# Patient Record
Sex: FEMALE | Race: WHITE | NOT HISPANIC OR LATINO | Employment: FULL TIME | URBAN - METROPOLITAN AREA
[De-identification: names, ages, dates, MRNs, and addresses within clinical notes are randomized per-mention and may not be internally consistent; named-entity substitution may affect disease eponyms.]

---

## 2017-04-25 ENCOUNTER — TELEPHONE (OUTPATIENT)
Dept: FAMILY MEDICINE | Facility: CLINIC | Age: 62
End: 2017-04-25

## 2017-04-25 ENCOUNTER — OFFICE VISIT (OUTPATIENT)
Dept: FAMILY MEDICINE | Facility: CLINIC | Age: 62
End: 2017-04-25
Payer: COMMERCIAL

## 2017-04-25 VITALS
HEART RATE: 71 BPM | TEMPERATURE: 98.2 F | WEIGHT: 112 LBS | RESPIRATION RATE: 14 BRPM | OXYGEN SATURATION: 99 % | HEIGHT: 63 IN | BODY MASS INDEX: 19.84 KG/M2 | SYSTOLIC BLOOD PRESSURE: 98 MMHG | DIASTOLIC BLOOD PRESSURE: 59 MMHG

## 2017-04-25 DIAGNOSIS — I73.00 RAYNAUD'S DISEASE WITHOUT GANGRENE: Primary | ICD-10-CM

## 2017-04-25 PROCEDURE — 99213 OFFICE O/P EST LOW 20 MIN: CPT | Performed by: FAMILY MEDICINE

## 2017-04-25 NOTE — NURSING NOTE
"Chief Complaint   Patient presents with     Hospital F/U       Initial BP 98/59  Pulse 71  Temp 98.2  F (36.8  C) (Oral)  Resp 14  Ht 5' 2.5\" (1.588 m)  Wt 112 lb (50.8 kg)  LMP 04/20/2015  SpO2 99%  BMI 20.16 kg/m2 Estimated body mass index is 20.16 kg/(m^2) as calculated from the following:    Height as of this encounter: 5' 2.5\" (1.588 m).    Weight as of this encounter: 112 lb (50.8 kg).  Medication Reconciliation: complete       Amber Love MA     "

## 2017-04-25 NOTE — TELEPHONE ENCOUNTER
Dr. Moss asked writer to contact patient due to MERS risk factor alert in chart.    Left message to call back and ask to speak with writer in triage.  ESTHER ZamoranoN, RN

## 2017-04-25 NOTE — MR AVS SNAPSHOT
"              After Visit Summary   4/25/2017    Arlene Beck    MRN: [id number]           Patient Information     Date Of Birth          1955        Visit Information        Provider Department      4/25/2017 10:00 AM Kait Moss MD [facility]        Today's Diagnoses     Raynaud's disease without gangrene    -  1       Follow-ups after your visit        Who to contact     If you have questions or need follow up information about today's clinic visit or your schedule please contact [facility] directly at [phone].  Normal or non-critical lab and imaging results will be communicated to you by [employer], letter or phone within 4 business days after the clinic has received the results. If you do not hear from us within 7 days, please contact the clinic through [employer] or phone. If you have a critical or abnormal lab result, we will notify you by phone as soon as possible.  Submit refill requests through [employer] or call your pharmacy and they will forward the refill request to us. Please allow 3 business days for your refill to be completed.          Additional Information About Your Visit        MyChart Information     [employer] gives you secure access to your electronic health record. If you see a primary care provider, you can also send messages to your care team and make appointments. If you have questions, please call your primary care clinic.  If you do not have a primary care provider, please call [phone] and they will assist you.        Care EveryWhere ID     This is your Care EveryWhere ID. This could be used by other organizations to access your [facility] records  [id number]        Your Vitals Were     Pulse Temperature Respirations Height Last Period Pulse Oximetry    71 98.2  F (36.8  C) (Oral) 14 5' 2.5\" (1.588 m) 04/20/2015 99%    BMI (Body Mass Index)                   20.16 kg/m2            Blood Pressure from Last 3 Encounters:   04/25/17 98/59 "   07/28/16 97/57   07/03/15 96/60    Weight from Last 3 Encounters:   04/25/17 112 lb (50.8 kg)   07/28/16 126 lb (57.2 kg)   07/03/15 122 lb (55.3 kg)              Today, you had the following     No orders found for display       Primary Care Provider Office Phone # Fax #    Kait Moss [phone]       [facility] [address] [facility] [address]        Thank you!     Thank you for choosing [facility]  for your care. Our goal is always to provide you with excellent care. Hearing back from our patients is one way we can continue to improve our services. Please take a few minutes to complete the written survey that you may receive in the mail after your visit with us. Thank you!             Your Updated Medication List - Protect others around you: Learn how to safely use, store and throw away your medicines at www.disposemymeds.org.          This list is accurate as of: 4/25/17 10:48 AM.  Always use your most recent med list.                   Brand Name Dispense Instructions for use    multivitamin per tablet     100    one daily, Dr Silva's  super food, takes 4 daily       * NONFORMULARY          * NONFORMULARY          Omega-3 & Omega-6 Fish Oil Caps          valACYclovir 500 MG tablet    VALTREX    60 tablet    Take 1 tablet (500 mg) by mouth 2 times daily       vitamin B complex with vitamin C Tabs tablet    STRESS TAB     takes two daily       * Notice:  This list has 2 medication(s) that are the same as other medications prescribed for you. Read the directions carefully, and ask your doctor or other care provider to review them with you.

## 2017-04-25 NOTE — PROGRESS NOTES
SUBJECTIVE:                                                    Arlene Beck is a 61 year old female who presents to clinic today for the following health issues:    ED/UC Followup:    [facility]ility[facility]   Date of visit: 4/19/17  Reason for visit: Raynaud syndrome   Current Status: Feeling fine today.        Patient has previously had Raynaud sx but never like this. Her fingers were white at the tips, her fourth finger was completely white to her ring, even after she removed her ring, and her fingers were numb. Her mother and younger sister have the same symptoms. Patient has had issues before with her hands, which have always tended to be cold but this episode surprised her. She was not in contact with anything cold, it was fairly warm out. The symptoms remained for longer than usual, so she called triage and was recommended to go to UC. They turned blue, then red, then it resolved by the time she was at .     Years ago she was diagnosed with chronic fatigue- Maxime Apiz but declines sx since. Has maintained a healthy diet and activity.    Patient has had some wrist pain with repetitive motion. She is conscious of getting up and moving around during the work day. For awhile she was having routine massages of her wrists for relief. She denies other joint pains. No complaint of muscle aches, malaise, fatigue, fevers. She has history of chronic fatigue 30 years ago, but no symptoms since that time.     She denies family history of autoimmune disease.       Problem list and histories reviewed & adjusted, as indicated.  Additional history: as documented    Patient Active Problem List   Diagnosis     Genital herpes     Irritable bowel syndrome     CARDIOVASCULAR SCREENING; LDL GOAL LESS THAN 160     Advanced directives, counseling/discussion     Fatigue     Well woman exam (no gynecological exam)     Raynaud's disease without gangrene     Past Surgical History:   Procedure Laterality Date     C  APPENDECTOMY  1967     C EXCISION OF LINGUAL TONSIL  1963     LAPAROSCOPY PROCEDURE UNLISTED  1975    2 times, for pain      TUBAL LIGATION  2002       Social History   Substance Use Topics     Smoking status: Never Smoker     Smokeless tobacco: Never Used     Alcohol use Yes      Comment: 3-4 wk wine     Family History   Problem Relation Age of Onset     Eye Disorder Mother      glaucoma     Allergies Mother      Breast Cancer Mother      double mastectomy     C.A.D. Father      CANCER Father      prostate     Allergies Father      HEART DISEASE Father      DIABETES Father      Hypertension Father      CEREBROVASCULAR DISEASE Father      Prostate Cancer Father      Asthma Maternal Grandmother      OSTEOPOROSIS Maternal Grandmother      Thyroid Disease Maternal Grandmother      Hypertension Sister      GASTROINTESTINAL DISEASE Sister      hyperplasia     Lipids Brother      tachycardia, knee and joint problems         Current Outpatient Prescriptions   Medication Sig Dispense Refill     valACYclovir (VALTREX) 500 MG tablet Take 1 tablet (500 mg) by mouth 2 times daily 60 tablet 9     NONFORMULARY        NONFORMULARY        OMEGA-3 & OMEGA-6 FISH OIL OR CAPS   0     MULTIVITAMINS OR TABS one daily, Dr Silva's  super food, takes 4 daily 100 1 YEAR     VITAMIN B COMPLEX OR TABS takes two daily  0     Allergies   Allergen Reactions     Codeine      Sulfa Drugs      Recent Labs   Lab Test  07/28/16   0855  08/24/15   0917  07/03/15   0755  08/01/14   1031   08/01/14   0810   LDL  120*   --   131*   --    --   106   HDL  94   --   64   --    --   83   TRIG  73   --   105   --    --   142   ALT   --   19   --   22   --    --    CR  0.89  0.80   --   0.87   < >   --    GFRESTIMATED  65  73   --   67   < >   --    GFRESTBLACK  79  88   --   81   < >   --    POTASSIUM  4.0  4.0   --   3.7   < >   --    TSH   --    --   1.29  0.84   --    --     < > = values in this interval not displayed.      BP Readings from Last 3  "Encounters:   04/25/17 98/59   07/28/16 97/57   07/03/15 96/60    Wt Readings from Last 3 Encounters:   04/25/17 50.8 kg (112 lb)   07/28/16 57.2 kg (126 lb)   07/03/15 55.3 kg (122 lb)        Reviewed and updated as needed this visit by clinical staff  Reviewed and updated as needed this visit by Provider    ROS:  Denies chest pain, SOB, URI sx. See above.    This document serves as a record of the services and decisions personally performed and made by Kait Moss MD. It was created on his/her behalf by Blessing Guerrero, trained medical scribe. The creation of this document is based the provider's statements to the medical scribes.    Scribe Blessing Guerrero, April 25, 2017  OBJECTIVE:                                                    BP 98/59  Pulse 71  Temp 98.2  F (36.8  C) (Oral)  Resp 14  Ht 1.588 m (5' 2.5\")  Wt 50.8 kg (112 lb)  LMP 04/20/2015  SpO2 99%  BMI 20.16 kg/m2  Body mass index is 20.16 kg/(m^2).  GENERAL: healthy, alert and no distress    Diagnostic Test Results:  none      ASSESSMENT/PLAN:                                                    1. Raynaud's disease without gangrene  Symptoms completely resolved after being seen in the . If recurrent symptoms to the same degree, could consider further testing, referral to ortho hand if significant carpal tunnel symptoms or to rheumatology. She will continue to try to avoid repetitive stress to wrists and continue massage, which has been helpful. Discussed sometimes medications such as calcium channel blockers like nifedipine or amlodipine are used to treat raynaud's, but her episodes are intermittent and mild and manageable in general by keeping her hands warm and she is not interested in medication.     She will schedule her preventive exam this summer.     The information in this document, created by the medical scribe for me, accurately reflects the services I personally performed and the decisions made by me. I have reviewed and " approved this document for accuracy. 04/25/17    Kait Moss MD  [facility]

## 2017-08-04 ENCOUNTER — MYC MEDICAL ADVICE (OUTPATIENT)
Dept: FAMILY MEDICINE | Facility: CLINIC | Age: 62
End: 2017-08-04

## 2017-08-04 DIAGNOSIS — Z12.31 ENCOUNTER FOR SCREENING MAMMOGRAM FOR BREAST CANCER: Primary | ICD-10-CM

## 2017-08-07 ENCOUNTER — OFFICE VISIT (OUTPATIENT)
Dept: FAMILY MEDICINE | Facility: CLINIC | Age: 62
End: 2017-08-07
Payer: COMMERCIAL

## 2017-08-07 VITALS
BODY MASS INDEX: 18.96 KG/M2 | WEIGHT: 107 LBS | RESPIRATION RATE: 14 BRPM | HEART RATE: 70 BPM | SYSTOLIC BLOOD PRESSURE: 101 MMHG | OXYGEN SATURATION: 98 % | HEIGHT: 63 IN | TEMPERATURE: 98.3 F | DIASTOLIC BLOOD PRESSURE: 66 MMHG

## 2017-08-07 DIAGNOSIS — Z13.1 SCREENING FOR DIABETES MELLITUS: ICD-10-CM

## 2017-08-07 DIAGNOSIS — N64.59 INVERSION, NIPPLE: ICD-10-CM

## 2017-08-07 DIAGNOSIS — Z78.9 ENGAGES IN TRAVEL ABROAD: ICD-10-CM

## 2017-08-07 DIAGNOSIS — Z00.00 ROUTINE GENERAL MEDICAL EXAMINATION AT A HEALTH CARE FACILITY: Primary | ICD-10-CM

## 2017-08-07 DIAGNOSIS — Z13.220 LIPID SCREENING: ICD-10-CM

## 2017-08-07 DIAGNOSIS — Z12.31 ENCOUNTER FOR SCREENING MAMMOGRAM FOR BREAST CANCER: ICD-10-CM

## 2017-08-07 DIAGNOSIS — A60.00 GENITAL HERPES SIMPLEX, UNSPECIFIED SITE: ICD-10-CM

## 2017-08-07 DIAGNOSIS — Z12.11 COLON CANCER SCREENING: ICD-10-CM

## 2017-08-07 LAB
ANION GAP SERPL CALCULATED.3IONS-SCNC: 6 MMOL/L (ref 3–14)
BUN SERPL-MCNC: 15 MG/DL (ref 7–30)
CALCIUM SERPL-MCNC: 9 MG/DL (ref 8.5–10.1)
CHLORIDE SERPL-SCNC: 110 MMOL/L (ref 94–109)
CHOLEST SERPL-MCNC: 239 MG/DL
CO2 SERPL-SCNC: 27 MMOL/L (ref 20–32)
CREAT SERPL-MCNC: 0.89 MG/DL (ref 0.52–1.04)
GFR SERPL CREATININE-BSD FRML MDRD: 65 ML/MIN/1.7M2
GLUCOSE SERPL-MCNC: 88 MG/DL (ref 70–99)
HDLC SERPL-MCNC: 76 MG/DL
LDLC SERPL CALC-MCNC: 150 MG/DL
NONHDLC SERPL-MCNC: 163 MG/DL
POTASSIUM SERPL-SCNC: 4.1 MMOL/L (ref 3.4–5.3)
SODIUM SERPL-SCNC: 143 MMOL/L (ref 133–144)
TRIGL SERPL-MCNC: 65 MG/DL

## 2017-08-07 PROCEDURE — 80061 LIPID PANEL: CPT | Performed by: FAMILY MEDICINE

## 2017-08-07 PROCEDURE — 99396 PREV VISIT EST AGE 40-64: CPT | Performed by: FAMILY MEDICINE

## 2017-08-07 PROCEDURE — 99213 OFFICE O/P EST LOW 20 MIN: CPT | Mod: 25 | Performed by: FAMILY MEDICINE

## 2017-08-07 PROCEDURE — 36415 COLL VENOUS BLD VENIPUNCTURE: CPT | Performed by: FAMILY MEDICINE

## 2017-08-07 PROCEDURE — 80048 BASIC METABOLIC PNL TOTAL CA: CPT | Performed by: FAMILY MEDICINE

## 2017-08-07 RX ORDER — VALACYCLOVIR HYDROCHLORIDE 500 MG/1
500 TABLET, FILM COATED ORAL 2 TIMES DAILY
Qty: 60 TABLET | Refills: 9 | Status: SHIPPED | OUTPATIENT
Start: 2017-08-07 | End: 2018-08-09
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
## 2017-08-07 NOTE — MR AVS SNAPSHOT
After Visit Summary   8/7/2017    Arlene Beck    MRN: [id number]           Patient Information     Date Of Birth          1955        Visit Information        Provider Department      8/7/2017 9:00 AM Kait Moss MD [facility]        Today's Diagnoses     Routine general medical examination at a health care facility    -  1    Inversion, nipple        Encounter for screening mammogram for breast cancer        Lipid screening        Screening for diabetes mellitus        Engages in travel abroad        Colon cancer screening        Genital herpes simplex, unspecified site        Genital herpes simplex          Care Instructions    I recommend that you visit the travel clinic in [facility] prior to international travel. They will review the immunizations, travel counseling and medications you might need prior to travel. You may also look at the [facility] website for the specific country to which you are traveling to get an idea of immunization needs and any potential risks/concerns regarding travel to the area.     Schedule a mammogram. The right side will be a diagnostic mammogram, since you have been observing a change in the right breast (nipple inversion). The left side will be a normal screening mammogram. Call your insurance prior to scheduling so you have an idea of the coverage for these services and whether mammogram with tomography is covered.       Preventive Health Recommendations  Female Ages 50 - 64    Yearly exam: See your health care provider every year in order to  o Review health changes.   o Discuss preventive care.    o Review your medicines if your doctor has prescribed any.      Get a Pap test every three years (unless you have an abnormal result and your provider advises testing more often).    If you get Pap tests with HPV test, you only need to test every 5 years, unless you have an abnormal result.     You do not need a Pap test if your uterus was removed  (hysterectomy) and you have not had cancer.    You should be tested each year for STDs (sexually transmitted diseases) if you're at risk.     Have a mammogram every 1 to 2 years.    Have a colonoscopy at age 50, or have a yearly FIT test (stool test). These exams screen for colon cancer.      Have a cholesterol test every 5 years, or more often if advised.    Have a diabetes test (fasting glucose) every three years. If you are at risk for diabetes, you should have this test more often.     If you are at risk for osteoporosis (brittle bone disease), think about having a bone density scan (DEXA).    Shots: Get a flu shot each year. Get a tetanus shot every 10 years.    Nutrition:     Eat at least 5 servings of fruits and vegetables each day.    Eat whole-grain bread, whole-wheat pasta and brown rice instead of white grains and rice.    Talk to your provider about Calcium and Vitamin D.     Lifestyle    Exercise at least 150 minutes a week (30 minutes a day, 5 days a week). This will help you control your weight and prevent disease.    Limit alcohol to one drink per day.    No smoking.     Wear sunscreen to prevent skin cancer.     See your dentist every six months for an exam and cleaning.    See your eye doctor every 1 to 2 years.            Follow-ups after your visit        Additional Services     TRAVEL CLINIC REFERRAL       Your provider has referred you to the [facility] - Please call [phone] to schedule an appointment.   Fax number: [phone]    Please be aware that coverage of these services is subject to the terms and limitations of your health insurance plans.  Call member services at your health plan with any benefit coverage questions.                  Future tests that were ordered for you today     Open Future Orders        Priority Expected Expires Ordered    MA Screen Left w/Mir Routine  8/7/2018 8/7/2017    MA Diagnostic Right w/Mir Routine  8/7/2018 8/7/2017    Fecal  "colorectal cancer screen FIT Routine 8/28/2017 10/30/2017 8/7/2017            Who to contact     If you have questions or need follow up information about today's clinic visit or your schedule please contact [facility] directly at [phone].  Normal or non-critical lab and imaging results will be communicated to you by MyChart, letter or phone within 4 business days after the clinic has received the results. If you do not hear from us within 7 days, please contact the clinic through [employer] or phone. If you have a critical or abnormal lab result, we will notify you by phone as soon as possible.  Submit refill requests through [employer] or call your pharmacy and they will forward the refill request to us. Please allow 3 business days for your refill to be completed.          Additional Information About Your Visit        [employer] Information     [employer] gives you secure access to your electronic health record. If you see a primary care provider, you can also send messages to your care team and make appointments. If you have questions, please call your primary care clinic.  If you do not have a primary care provider, please call [phone] and they will assist you.        Care EveryWhere ID     This is your Care EveryWhere ID. This could be used by other organizations to access your [place] medical records  [id number]        Your Vitals Were     Pulse Temperature Respirations Height Last Period Pulse Oximetry    70 98.3  F (36.8  C) (Oral) 14 5' 2.5\" (1.588 m) 04/20/2015 98%    BMI (Body Mass Index)                   19.26 kg/m2            Blood Pressure from Last 3 Encounters:   08/07/17 101/66   04/25/17 98/59   07/28/16 97/57    Weight from Last 3 Encounters:   08/07/17 107 lb (48.5 kg)   04/25/17 112 lb (50.8 kg)   07/28/16 126 lb (57.2 kg)              We Performed the Following     Basic metabolic panel     Lipid panel reflex to direct LDL     OFFICE/OUTPT VISIT,EST,LEVL III     TRAVEL CLINIC " REFERRAL          Where to get your medicines      These medications were sent to [facility] - [place], [place] - [address]  [address], [facility] [address]     Phone:  [phone]     valACYclovir 500 MG tablet          Primary Care Provider Office Phone # Fax #    Kait Moss [phone]       [facility] [address]  [facility] [address]        Equal Access to Services     CRUZ OSULLIVAN : Hadii aad ku hadasho Soomaali, waaxda luqadaha, qaybta kaalmada adeegyada, waxay idiin hayaan adeeg kharash la'sharad . So [facility] [phone].    ATENCIÓN: Si habla español, tiene a garcia disposición servicios gratuitos de asistencia lingüística. [facility] [phone].    We comply with applicable federal civil rights laws and [place] laws. We do not discriminate on the basis of race, color, national origin, age, disability sex, sexual orientation or gender identity.            Thank you!     Thank you for choosing [facility]  for your care. Our goal is always to provide you with excellent care. Hearing back from our patients is one way we can continue to improve our services. Please take a few minutes to complete the written survey that you may receive in the mail after your visit with us. Thank you!             Your Updated Medication List - Protect others around you: Learn how to safely use, store and throw away your medicines at www.disposemymeds.org.          This list is accurate as of: 8/7/17  9:18 AM.  Always use your most recent med list.                   Brand Name Dispense Instructions for use Diagnosis    multivitamin per tablet     100    one daily, Dr Silva's  super food, takes 4 daily    Routine general medical examination at a health care facility       * NONFORMULARY           * NONFORMULARY           Omega-3 & Omega-6 Fish Oil Caps       Routine general medical examination at a health care facility       valACYclovir 500 MG tablet     VALTREX    60 tablet    Take 1 tablet (500 mg) by mouth 2 times daily    Genital herpes simplex, unspecified site       vitamin B complex with vitamin C Tabs tablet    STRESS TAB     takes two daily    Routine general medical examination at a health care facility       * Notice:  This list has 2 medication(s) that are the same as other medications prescribed for you. Read the directions carefully, and ask your doctor or other care provider to review them with you.

## 2017-08-07 NOTE — PATIENT INSTRUCTIONS
I recommend that you visit the travel clinic in [facility] prior to international travel. They will review the immunizations, travel counseling and medications you might need prior to travel. You may also look at the [facility] website for the specific country to which you are traveling to get an idea of immunization needs and any potential risks/concerns regarding travel to the area.     Schedule a mammogram. The right side will be a diagnostic mammogram, since you have been observing a change in the right breast (nipple inversion). The left side will be a normal screening mammogram. Call your insurance prior to scheduling so you have an idea of the coverage for these services and whether mammogram with tomography is covered.       Preventive Health Recommendations  Female Ages 50 - 64    Yearly exam: See your health care provider every year in order to  o Review health changes.   o Discuss preventive care.    o Review your medicines if your doctor has prescribed any.      Get a Pap test every three years (unless you have an abnormal result and your provider advises testing more often).    If you get Pap tests with HPV test, you only need to test every 5 years, unless you have an abnormal result.     You do not need a Pap test if your uterus was removed (hysterectomy) and you have not had cancer.    You should be tested each year for STDs (sexually transmitted diseases) if you're at risk.     Have a mammogram every 1 to 2 years.    Have a colonoscopy at age 50, or have a yearly FIT test (stool test). These exams screen for colon cancer.      Have a cholesterol test every 5 years, or more often if advised.    Have a diabetes test (fasting glucose) every three years. If you are at risk for diabetes, you should have this test more often.     If you are at risk for osteoporosis (brittle bone disease), think about having a bone density scan (DEXA).    Shots: Get a flu shot each year. Get a tetanus shot every 10  years.    Nutrition:     Eat at least 5 servings of fruits and vegetables each day.    Eat whole-grain bread, whole-wheat pasta and brown rice instead of white grains and rice.    Talk to your provider about Calcium and Vitamin D.     Lifestyle    Exercise at least 150 minutes a week (30 minutes a day, 5 days a week). This will help you control your weight and prevent disease.    Limit alcohol to one drink per day.    No smoking.     Wear sunscreen to prevent skin cancer.     See your dentist every six months for an exam and cleaning.    See your eye doctor every 1 to 2 years.

## 2017-08-07 NOTE — NURSING NOTE
"Chief Complaint   Patient presents with     Physical       Initial /66  Pulse 70  Temp 98.3  F (36.8  C) (Oral)  Resp 14  Ht 5' 2.5\" (1.588 m)  Wt 107 lb (48.5 kg)  LMP 04/20/2015  SpO2 98%  BMI 19.26 kg/m2 Estimated body mass index is 19.26 kg/(m^2) as calculated from the following:    Height as of this encounter: 5' 2.5\" (1.588 m).    Weight as of this encounter: 107 lb (48.5 kg).  Medication Reconciliation: complete     Amber Love MA     "

## 2017-08-11 ENCOUNTER — HOSPITAL ENCOUNTER (OUTPATIENT)
Dept: MAMMOGRAPHY | Facility: CLINIC | Age: 62
Discharge: HOME | End: 2017-08-11
Attending: FAMILY MEDICINE | Admitting: FAMILY MEDICINE
Payer: COMMERCIAL

## 2017-08-11 DIAGNOSIS — Z12.31 VISIT FOR SCREENING MAMMOGRAM: ICD-10-CM

## 2017-08-11 PROCEDURE — G0202 SCR MAMMO BI INCL CAD: HCPCS

## 2017-08-11 PROCEDURE — 77063 BREAST TOMOSYNTHESIS BI: CPT

## 2017-08-29 PROCEDURE — 82274 ASSAY TEST FOR BLOOD FECAL: CPT | Performed by: FAMILY MEDICINE

## 2017-08-29 NOTE — PROGRESS NOTES
The results of your recent lipid (cholesterol) profile were mildly abnormal.    Here are the results:  Lab Results       Component                Value               Date                       CHOL                     239                 08/07/2017            Lab Results       Component                Value               Date                       HDL                      76                  08/07/2017            Lab Results       Component                Value               Date                       LDL                      150                 08/07/2017            Lab Results       Component                Value               Date                       TRIG                     65                  08/07/2017            Lab Results       Component                Value               Date                       CHOLHDLRATIO             3.4                 07/03/2015              Desired or goal levels are:  CHOLESTEROL: Desirable is less than 200.   HDL (Good Cholesterol): Desirable is greater than 40 (for men) greater than 50 (for women).  LDL (Bad Cholesterol): Desirable is less than 130 (or less than 100 if you have heart disease or diabetes). Borderline 130-160.  TRIGLYCERIDES: Desirable is less than 150.  Borderline is 150-200.    To help lower your LDL (bad cholesterol) you can start adding ground flax seed to your diet. The recommended dose is 2 heaping tablespoonfuls daily, you may want to start with 1 tablespoonful and increase to 2 heaping tablespoonfuls. You can mix or add ground flax seed to hot cereals, yogurt, applesauce, cottage cheese or any sauce mixture that is your portion. Ground flax seed can be found in the baking aisle near the flour.      As you may know, an elevated cholesterol is one factor that increases your risk for heart disease and stroke. You can improve your cholesterol by controlling the amount and type of fat you eat and by increasing your daily activity level.    Here are some ways  to improve your nutrition:  Eat less fat (especially butter, Crisco and other saturated fats)  Buy lean cuts of meat, reduce your portions of red meat or substitute poultry or fish  Use skim milk and low-fat dairy products  Eat no more than 4 egg yolks per week  Avoid fried or fast foods that are high in fat  Eat more fruits and vegetables      Also consider starting or increasing your aerobic activity. Aerobic activity is the best way to improve HDL (good) cholesterol. If this would be new to you, please talk with me first about what activities are safe for you.      Other lab results:    The testing of your blood sugar, kidney function, and electrolytes was stable.      Please feel free to contact us with any questions or if you would like more information.    Kait Moss M.D.

## 2017-08-31 DIAGNOSIS — Z12.11 COLON CANCER SCREENING: ICD-10-CM

## 2017-08-31 LAB — HEMOCCULT STL QL IA: NEGATIVE

## 2017-09-05 NOTE — PROGRESS NOTES
Excellent! Please call or sent a "[employer]" message if you have any questions. Kait Moss M.D.

## 2018-02-23 ENCOUNTER — VIRTUAL VISIT (OUTPATIENT)
Dept: FAMILY MEDICINE | Facility: OTHER | Age: 63
End: 2018-02-23

## 2018-02-23 ENCOUNTER — TELEPHONE (OUTPATIENT)
Dept: FAMILY MEDICINE | Facility: CLINIC | Age: 63
End: 2018-02-23

## 2018-02-23 NOTE — TELEPHONE ENCOUNTER
Influenza-Like Illness (MURTAZA) Protocol    Arlene Jaydaayden Beck      Age: 62 year old     YOB: 1955    Are you currently sick or have you had close contact with someone who is currently sick?   Yes, this patient is currently sick.     Adult Clinical Evaluation    Is this patient experiencing ANY of the following?  Unconsciousness or unresponsiveness No   Difficulty breathing or swallowing No   Blue or dusky lips, skin, or nail beds No   Chest pain No   Severe confusion or delirium No   Seizure activity: ongoing or stopped No   Severe dehydration or signs of shock No   Patient sounds very sick on the phone No     Is this patient experiencing ANY of the following?  Fever > 104 or shaking chills No   Wheezing with minimal response to usual wheezing medications or new wheezing No   Repeated vomiting or diarrhea with signs of dehydration (no urination within last 12 hours) No   Flu-like symptoms that initially improved but returned with fever and a worse cough No   Stiff or painful neck Yes.  Patient advised to be taken to the ED now.   Severe headache No     Does the patient have any of the following?  Measured fever > 100 degrees Yes   Chills or feels very warm to the touch Yes   Cough Yes   Sore throat Yes   Muscle/ body aches Yes   Headaches Yes   Fatigue (tiredness) Yes     Nursing Plan  Routed chart to provider, patient states she has a very stiff and painful neck  Was advised to go to ER per protocol  Patient states she will likely go to an Urgent Care near her house because she does not want to go to ER and does not want to drive to clinic  Wanted Tamiflu over the phone    Provided home care instructions    General home care instruction:      Avoid contact with people in your household who are at increased risk for more severe complications of influenza (such as pregnant women or people who have a chronic health condition, for example diabetes, heart disease, asthma, or emphysema).    Stay home from  work, school, childcare or other public places until your fever (37.8 degrees Celsius [100 degrees Fahrenheit]) has been gone for at least 24 hours, except to seek medical care. (Fever should be gone without the use of fever-reducing medications.) Use a surgical mask if available, or cover your mouth and nose with a tissue if possible if you need to seek medical care. Contact your work place, school, or  as they may have longer exclusion times.    You may continue to shed virus after your fever is gone. Limit your contact with high-risk individuals for 10 days after your symptoms started and be especially careful to cover your coughs/sneezes and wash your hands.    Cover your cough and wash your hands often, and especially after coughing, sneezing, blowing your nose.    Drink plenty of fluids (such as water, broth, sports drinks, electrolyte beverages for children) to prevent dehydration.    Avoid tobacco and second hand smoke.    Get plenty of rest.    Use over-the-counter pain relievers as needed per  instructions.    Do not give aspirin (acetylsalicylic acid) or products that contain aspirin (e.g. bismuth subsalicylate - Pepto Bismol) to children or teenagers 18 years or younger.    A small number of people with influenza do not have fever. If you have respiratory symptoms and are at increased risk for complications of influenza, contact your health care provider to discuss these symptoms.    For parents of infants:    If possible, only family members who are not sick should care for infants.    Wash your hands with soap and water, or an alcohol-based hand rub (if your hands are not visibly soiled) before caring for your infant.    Cover your mouth and nose with a tissue when coughing or sneezing, and clean your hands.    Contact a health care provider to discuss your illness within 1-2 days if you are    Pregnant    Immunocompromised    Call 911 if you experience:    Difficulty breathing or  shortness of breath    Pain or pressure in the chest    Confusion or less responsive than normal    Seizure activity: ongoing or stopped    Severe dehydration or signs of shock     Blue or dusky lips, skin, or nail beds    If further questions/concerns or if new symptoms develop, call your PCP or [facility] as soon as possible.    When to seek medical attention    Contact your health care provider right away if you experience:    A painful sore throat accompanied by fever persists for more than 48 hours    Ear pain, sinus pain, persistent vomiting and/or diarrhea    Oral temperature greater than 104  Fahrenheit (40  Celsius)    Dehydration (e.g., mouth feeling dry, dizzy when sitting/standing, decreased urine output)    Severe or persistent vomiting; unable to keep fluids down    Improvement in flu-like symptoms (fever and cough or sore throat) but then return of fever and worse cough or sore throat    Not drinking enough fluid    Any other concerns not stated above      Additional educational resources include:    http://www.[employer].com    http://www.cdc.gov/flu/  Ivonne Quinones

## 2018-02-23 NOTE — PROGRESS NOTES
"Date:   Clinician: Frandy Lombardi  Clinician NPI: [id number]  Patient: Arlene Beck  Patient : 1955  Patient Address: [address] [place] [address]  Patient Phone: [phone]  Visit Protocol: URI  Patient Summary:  Arlene is a 62 year old ( : 1955 ) female who initiated a Visit for cold, sinus infection, or influenza. When asked the question \"Please sign me up to receive news, health information and promotions from [employer].\", Arlene responded \"No\".    Arlene states her symptoms started 1-2 days ago.   Her symptoms consist of myalgia, rhinitis, malaise, a headache, chills, nasal congestion, a cough, and facial pain or pressure. Arlene also feels feverish.   Symptom details     Nasal secretions: The color of her mucus is yellow.    Cough: Arlene coughs a few times an hour and her cough is more bothersome at night. Phlegm comes into her throat when she coughs. She believes the phlegm causes the cough. The color of the phlegm is yellow.     Temperature: Her current temperature is 101.4 degrees Fahrenheit. Arlene has had a temperature over 100 degrees Fahrenheit for 1-2 days.     Facial pain or pressure: The facial pain or pressure does not feel worse when bending or leaning forward.     Headache: She states the headache is severe (between 7-9 on a 10 point pain scale).      Arlene denies having teeth pain, sore throat, dyspnea, ear pain, and wheezing. She also denies having recent facial or sinus surgery in the past 60 days, taking antibiotic medication for the symptoms, and having a sinus infection within the past year.   She has recently been exposed to someone with influenza. Arlene has been in close contact with the following high risk individuals: adults 65 or older.   Weight: 108 lbs   Arlene does not smoke or use smokeless tobacco.   She denies pregnancy and denies breastfeeding. She does not menstruate.   Additional information as reported by the patient (free text): I am " living in a household where others have had the flu. I am hoping that I can be provided with the Tamiflu medication that can assist in getting over the flu.  MEDICATIONS:   Patient free text response: Valacyclovir  , ALLERGIES:   sulfa (Bactrim/Septra)    Clinician Response:  Dear Arlene,  Based on the information provided, you have influenza (the flu). The flu is a very contagious infection that can lead to a serious illness in anyone, but some are at risk for becoming more sick than others. For this reason, it is important to know you have the flu so you can take steps to prevent spreading it to others.  Medication information  I am prescribing:     Oseltamivir (Tamiflu) 75 mg oral tablet. Take 1 tablet by mouth 2 times a day for 5 days. There are no refills with this prescription.   Self care  The following tips will keep you as comfortable as possible while you recover:     Rest    Drink plenty of water and other liquids    Take a hot shower to loosen congestion    Take a spoonful of honey to reduce your cough     If you have a fever, stay home until your temperature has returned to normal for 24 hours and you feel well enough for daily activities. And of course, wash your hands often to prevent spreading the flu and other illnesses. However, the best way to prevent the flu is to get a flu shot before each flu season.  When to seek care  Please be seen in a clinic or urgent care if new symptoms develop, or symptoms become worse.   Diagnosis: Influenza  Diagnosis ICD: J11.1  Prescription: oseltamivir (Tamiflu) 75 mg oral tablet 10 tablets, 5 days supply. Take 1 tablet by mouth 2 times a day for 5 days. Refills: 0, Refill as needed: no, Allow substitutions: yes  [id number] - [phone] - [address] [place] [address]

## 2018-03-19 ENCOUNTER — OFFICE VISIT (OUTPATIENT)
Dept: FAMILY MEDICINE | Facility: CLINIC | Age: 63
End: 2018-03-19
Payer: COMMERCIAL

## 2018-03-19 VITALS
TEMPERATURE: 98.2 F | HEART RATE: 64 BPM | WEIGHT: 113 LBS | HEIGHT: 63 IN | BODY MASS INDEX: 20.02 KG/M2 | DIASTOLIC BLOOD PRESSURE: 63 MMHG | SYSTOLIC BLOOD PRESSURE: 108 MMHG | RESPIRATION RATE: 12 BRPM | OXYGEN SATURATION: 100 %

## 2018-03-19 DIAGNOSIS — K13.79 MOUTH PAIN: Primary | ICD-10-CM

## 2018-03-19 DIAGNOSIS — R21 RASH: ICD-10-CM

## 2018-03-19 PROCEDURE — 99213 OFFICE O/P EST LOW 20 MIN: CPT | Performed by: FAMILY MEDICINE

## 2018-03-19 NOTE — PROGRESS NOTES
"  SUBJECTIVE:   Arlene Beck is a 62 year old female who presents to clinic today for the following health issues:      Mouth Problem      Duration: 6 days     Description (location/character/radiation):  bumps on tongue and in throat; cold sores on corner of left side of mouth and left nostril.    Intensity:  Oral sores are moderately painful and itchy    Accompanying signs and symptoms: sore throat     History (similar episodes/previous evaluation): hx of chicken pox     Precipitating or alleviating factors: Recent influenza    Therapies tried and outcome: increased valacyclovir dose    Additional: she got hives from Tamiflu        Complicated history:  Had sudden onset of \"flu-like\" sx on Feb 23  Temp to 103.  Body aches.  Headache.  Loss of appetite.  Sore throat.    Was given Tamiflu per phone protocol.  Increased valtrex dose to 500 mg BID.  (She normally takes 500 mg QD prophylacticly.)  Developed itchy rash on her back at the end of Tamiflu course - sister thought it was hives.  Then \"red bumps\" on left hip.  Used aloe vera on the bumps on her left hip and that seemed to help.    Saw dentist last week (March 14) to prep for crowns which involved lidocaine.  Developed mouth pain afterwards with bumps on left side of tongue - some became \"white blisters.\"  One under her tongue on the left.  Cold sore on tip of nose and left corner of lip.  Used lysine on cold sore on left lip which helped.  Oral sores have improved but now she has left-sided sore throat.    Worried this is shingles.    Problem list and histories reviewed & adjusted, as indicated.  Additional history: as documented    BP Readings from Last 3 Encounters:   03/19/18 108/63   08/07/17 101/66   04/25/17 98/59    Wt Readings from Last 3 Encounters:   03/19/18 113 lb (51.3 kg)   08/07/17 107 lb (48.5 kg)   04/25/17 112 lb (50.8 kg)          Reviewed and updated as needed this visit by clinical staff  Tobacco  Allergies  Meds  Med Hx  Surg " "Hx  Fam Hx  Soc Hx        ROS:  ENT: NEGATIVE for nasal congestion/rhinorrhea  RESP: NEGATIVE for cough  GI: NEGATIVE for nausea/vomiting     OBJECTIVE:     /63 (Cuff Size: Adult Regular)  Pulse 64  Temp 98.2  F (36.8  C) (Oral)  Resp 12  Ht 5' 2.5\" (1.588 m)  Wt 113 lb (51.3 kg)  LMP 04/20/2015  SpO2 100%  BMI 20.34 kg/m2  Body mass index is 20.34 kg/(m^2).  GEN:  no apparent distress  EYES: PERRL, EOMI, conjunctivae and sclerae clear  ENT: external ears and nose without lesions or scars, TM's and canals clear bilaterally and oropharynx with moist mucus membranes and normal landmarks and a few vesicles on palate  NECK:  Supple without adenopathy, mass, or thyromegaly  LUNGS:  normal respiratory effort, and lungs clear to auscultation bilaterally - no rales, rhonchi or wheezes  SKIN: Clear. No significant rash, abnormal pigmentation or lesions on back/hip.  NEURO:  No focal deficits noted, No facial asymmetry, Equal movement of all 4 extremities, Strength grossly intact      ASSESSMENT/PLAN:     1. Mouth pain  Unclear etiology.  I doubt this is VZV.  Possibly related to the dental work or a reaction to the lidocaine but that also seems unlikely.  She did have recent outbreak of HSV and the mouth pain could have been related to that.  I don't see any value in further increasing the valtrex dose at this time.  Symptoms do seem to be resolving so she will continue to monitor and notify us if symptoms persist/worsen.  I offered her magic mouthwash but she declined.    2. Rash  Patient reported rash on low back and left hip.  Currently not present.  Again discussed that this is unlikely zoster.  Possibly it was an allergic reaction to the Tamiflu as it occurred at the end of the therapy - although the limited location of the rash makes that rather unlikely.  In any case it has resolved.  Advised caution if she uses Tamiflu again - monitor for itching, rash, or other signs and symptoms of allergic " reaction.        Kait Carey MD  [facility] [place]

## 2018-08-09 ENCOUNTER — OFFICE VISIT (OUTPATIENT)
Dept: FAMILY MEDICINE | Facility: CLINIC | Age: 63
End: 2018-08-09
Payer: COMMERCIAL

## 2018-08-09 VITALS
TEMPERATURE: 98.6 F | SYSTOLIC BLOOD PRESSURE: 106 MMHG | HEIGHT: 63 IN | WEIGHT: 114.25 LBS | OXYGEN SATURATION: 100 % | BODY MASS INDEX: 20.24 KG/M2 | HEART RATE: 77 BPM | DIASTOLIC BLOOD PRESSURE: 63 MMHG

## 2018-08-09 DIAGNOSIS — Z12.11 SCREEN FOR COLON CANCER: ICD-10-CM

## 2018-08-09 DIAGNOSIS — Z00.00 ENCOUNTER FOR ROUTINE ADULT HEALTH EXAMINATION WITHOUT ABNORMAL FINDINGS: Primary | ICD-10-CM

## 2018-08-09 DIAGNOSIS — Z12.4 SCREENING FOR MALIGNANT NEOPLASM OF CERVIX: ICD-10-CM

## 2018-08-09 DIAGNOSIS — Z13.220 LIPID SCREENING: ICD-10-CM

## 2018-08-09 DIAGNOSIS — Z12.31 VISIT FOR SCREENING MAMMOGRAM: ICD-10-CM

## 2018-08-09 DIAGNOSIS — A60.00 GENITAL HERPES SIMPLEX, UNSPECIFIED SITE: ICD-10-CM

## 2018-08-09 LAB
ANION GAP SERPL CALCULATED.3IONS-SCNC: 10 MMOL/L (ref 3–14)
BUN SERPL-MCNC: 12 MG/DL (ref 7–30)
CALCIUM SERPL-MCNC: 9 MG/DL (ref 8.5–10.1)
CHLORIDE SERPL-SCNC: 106 MMOL/L (ref 94–109)
CHOLEST SERPL-MCNC: 267 MG/DL
CO2 SERPL-SCNC: 24 MMOL/L (ref 20–32)
CREAT SERPL-MCNC: 0.81 MG/DL (ref 0.52–1.04)
GFR SERPL CREATININE-BSD FRML MDRD: 72 ML/MIN/1.7M2
GLUCOSE SERPL-MCNC: 83 MG/DL (ref 70–99)
HDLC SERPL-MCNC: 93 MG/DL
LDLC SERPL CALC-MCNC: 153 MG/DL
NONHDLC SERPL-MCNC: 174 MG/DL
POTASSIUM SERPL-SCNC: 3.9 MMOL/L (ref 3.4–5.3)
SODIUM SERPL-SCNC: 140 MMOL/L (ref 133–144)
TRIGL SERPL-MCNC: 104 MG/DL

## 2018-08-09 PROCEDURE — G0145 SCR C/V CYTO,THINLAYER,RESCR: HCPCS | Performed by: FAMILY MEDICINE

## 2018-08-09 PROCEDURE — 80048 BASIC METABOLIC PNL TOTAL CA: CPT | Performed by: FAMILY MEDICINE

## 2018-08-09 PROCEDURE — 36415 COLL VENOUS BLD VENIPUNCTURE: CPT | Performed by: FAMILY MEDICINE

## 2018-08-09 PROCEDURE — 87624 HPV HI-RISK TYP POOLED RSLT: CPT | Performed by: FAMILY MEDICINE

## 2018-08-09 PROCEDURE — 80061 LIPID PANEL: CPT | Performed by: FAMILY MEDICINE

## 2018-08-09 PROCEDURE — 99396 PREV VISIT EST AGE 40-64: CPT | Performed by: FAMILY MEDICINE

## 2018-08-09 RX ORDER — VALACYCLOVIR HYDROCHLORIDE 500 MG/1
500 TABLET, FILM COATED ORAL 2 TIMES DAILY
Qty: 180 TABLET | Refills: 3 | Status: SHIPPED | OUTPATIENT
Start: 2018-08-09 | End: 2019-08-15

## 2018-08-09 RX ORDER — VALACYCLOVIR HYDROCHLORIDE 500 MG/1
500 TABLET, FILM COATED ORAL 2 TIMES DAILY
Qty: 180 TABLET | Refills: 3 | Status: SHIPPED | OUTPATIENT
Start: 2018-08-09 | End: 2018-08-09

## 2018-08-09 NOTE — PROGRESS NOTES
SUBJECTIVE:   CC: Arlene Beck is an 63 year old woman who presents for preventive health visit.     Physical   Annual:     Getting at least 3 servings of Calcium per day:  Yes    Bi-annual eye exam:  Yes    Dental care twice a year:  Yes    Sleep apnea or symptoms of sleep apnea:  None    Diet:  Other    Frequency of exercise:  6-7 days/week    Duration of exercise:  15-30 minutes    Taking medications regularly:  Yes    Medication side effects:  None    Additional concerns today:  No    She has been doing well.  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  Her trip to [facility] was canceled last year due to a change in finding with the current [employer] administration.  They have been preparing for that trip for 3 years.    Today's PHQ-2 Score:   PHQ-2 ( 1999 [employer]) 8/9/2018   Q1: Little interest or pleasure in doing things 0   Q2: Feeling down, depressed or hopeless 2   PHQ-2 Score 2   Q1: Little interest or pleasure in doing things Not at all   Q2: Feeling down, depressed or hopeless More than half the days   PHQ-2 Score 2       Abuse: Current or Past(Physical, Sexual or Emotional)- No  Do you feel safe in your environment - Yes    Social History   Substance Use Topics     Smoking status: Never Smoker     Smokeless tobacco: Never Used     Alcohol use No      Comment: has not drank over 1 year      Alcohol Use 8/9/2018   If you drink alcohol do you typically have greater than 3 drinks per day OR greater than 7 drinks per week? No       Reviewed orders with patient.  Reviewed health maintenance and updated orders accordingly - Yes  BP Readings from Last 3 Encounters:   08/09/18 106/63   03/19/18 108/63   08/07/17 101/66    Wt Readings from Last 3 Encounters:   08/09/18 114 lb 4 oz (51.8 kg)   03/19/18 113 lb (51.3 kg)   08/07/17 107 lb (48.5 kg)                  Patient Active Problem List   Diagnosis     Genital herpes     Irritable bowel syndrome     CARDIOVASCULAR SCREENING; LDL GOAL LESS  THAN 160     Advanced directives, counseling/discussion     Fatigue     Well woman exam (no gynecological exam)     Raynaud's disease without gangrene     Past Surgical History:   Procedure Laterality Date     C APPENDECTOMY  1967     C EXCISION OF LINGUAL TONSIL  1963     LAPAROSCOPY PROCEDURE UNLISTED  1975    2 times, for pain      TUBAL LIGATION  2002       Social History   Substance Use Topics     Smoking status: Never Smoker     Smokeless tobacco: Never Used     Alcohol use No      Comment: has not drank over 1 year      Family History   Problem Relation Age of Onset     Eye Disorder Mother      glaucoma     Allergies Mother      Breast Cancer Mother      double mastectomy     C.A.D. Father      Cancer Father      prostate     Allergies Father      HEART DISEASE Father      Diabetes Father      Hypertension Father      Cerebrovascular Disease Father      Prostate Cancer Father      Asthma Maternal Grandmother      Osteoperosis Maternal Grandmother      Thyroid Disease Maternal Grandmother      Hypertension Sister      GASTROINTESTINAL DISEASE Sister      hyperplasia     Lipids Brother      tachycardia, knee and joint problems         Current Outpatient Prescriptions   Medication Sig Dispense Refill     MULTIVITAMINS OR TABS one daily, Dr Silva's  super food, takes 4 daily 100 1 YEAR     NONFORMULARY        NONFORMULARY        OMEGA-3 & OMEGA-6 FISH OIL OR CAPS   0     valACYclovir (VALTREX) 500 MG tablet Take 1 tablet (500 mg) by mouth 2 times daily 180 tablet 3     VITAMIN B COMPLEX OR TABS takes two daily  0     [DISCONTINUED] valACYclovir (VALTREX) 500 MG tablet Take 1 tablet (500 mg) by mouth 2 times daily 180 tablet 3     [DISCONTINUED] valACYclovir (VALTREX) 500 MG tablet Take 1 tablet (500 mg) by mouth 2 times daily 60 tablet 9     Allergies   Allergen Reactions     Codeine      Sulfa Drugs      Recent Labs   Lab Test  08/07/17   0931  07/28/16   0855  08/24/15   0917  07/03/15   0755  08/01/14   1031  "  LDL  150*  120*   --   131*   --    HDL  76  94   --   64   --    TRIG  65  73   --   105   --    ALT   --    --   19   --   22   CR  0.89  0.89  0.80   --   0.87   GFRESTIMATED  65  65  73   --   67   GFRESTBLACK  78  79  88   --   81   POTASSIUM  4.1  4.0  4.0   --   3.7   TSH   --    --    --   1.29  0.84        Patient over age 50, mutual decision to screen reflected in health maintenance.    Pertinent mammograms are reviewed under the imaging tab.  History of abnormal Pap smear:   NO - age 30-65 PAP every 5 years with negative HPV co-testing recommended  Last 3 Pap and HPV Results:   PAP / HPV 7/3/2015 2012 2010   PAP NIL NIL NIL     PAP / HPV 7/3/2015 2012 2010   PAP NIL NIL NIL     Reviewed and updated as needed this visit by clinical staff  Tobacco  Allergies  Meds         Reviewed and updated as needed this visit by Provider        Past Medical History:   Diagnosis Date     COPD (chronic obstructive pulmonary disease) (H)     I'm not sure if what I get rises to this condition     Fatigue 7/3/2015     Genital herpes, unspecified      Irritable bowel syndrome      Uncomplicated asthma     I have had ALLERGIC reaction asthma      Past Surgical History:   Procedure Laterality Date     C APPENDECTOMY       C EXCISION OF LINGUAL TONSIL       LAPAROSCOPY PROCEDURE UNLISTED      2 times, for pain      TUBAL LIGATION       Obstetric History       T0      L0     SAB1   TAB2   Ectopic0   Multiple0   Live Births0       # Outcome Date GA Lbr Bhavin/2nd Weight Sex Delivery Anes PTL Lv   3 TAB            2 TAB            1 SAB                   Review of Systems   ROS: 10 point ROS neg other than the symptoms noted above in the HPI.      OBJECTIVE:   /63  Pulse 77  Temp 98.6  F (37  C) (Oral)  Ht 5' 2.5\" (1.588 m)  Wt 114 lb 4 oz (51.8 kg)  LMP 2015  SpO2 100%  BMI 20.56 kg/m2  Physical Exam  GENERAL APPEARANCE: healthy, alert and no distress  EYES: " Eyes grossly normal to inspection, PERRL and conjunctivae and sclerae normal  HENT: ear canals and TM's normal, nose and mouth without ulcers or lesions, oropharynx clear and oral mucous membranes moist  NECK: no adenopathy, no asymmetry, masses, or scars and thyroid normal to palpation  RESP: lungs clear to auscultation - no rales, rhonchi or wheezes  BREAST: normal without masses, tenderness or nipple discharge and no palpable axillary masses or adenopathy  CV: regular rate and rhythm, normal S1 S2, no S3 or S4, no murmur, click or rub, no peripheral edema and peripheral pulses strong  ABDOMEN: soft, nontender, no hepatosplenomegaly, no masses and bowel sounds normal   (female): normal female external genitalia, normal urethral meatus, vaginal mucosal atrophy noted, normal cervix   MS: no musculoskeletal defects are noted and gait is age appropriate without ataxia  SKIN: no suspicious lesions or rashes  NEURO: Normal strength and tone, sensory exam grossly normal, mentation intact and speech normal  PSYCH: mentation appears normal and affect normal/bright    Diagnostic Test Results:  none     ASSESSMENT/PLAN:   1. Encounter for routine adult health examination without abnormal findings   pap today.   She will schedule mammogram   Discussed new shingles vaccine Shingrix and she has already received the first dose, due for second now.   She will complete FIT test, prefers that over colonoscopy     2. Screen for colon cancer     - Fecal colorectal cancer screen FIT - Future (S+30); Future    3. Visit for screening mammogram     - MA SCREENING DIGITAL BILAT - Future  (s+30); Future    4. Screening for malignant neoplasm of cervix     - Pap imaged thin layer screen with HPV - recommended age 30 - 65 years (select HPV order below)  - HPV High Risk Types DNA Cervical    5. Genital herpes simplex, unspecified site     - BASIC METABOLIC PANEL    6. Lipid screening     - Lipid panel reflex to direct LDL  "Fasting    COUNSELING:  Reviewed preventive health counseling, as reflected in patient instructions    BP Readings from Last 1 Encounters:   08/09/18 106/63     Estimated body mass index is 20.56 kg/(m^2) as calculated from the following:    Height as of this encounter: 5' 2.5\" (1.588 m).    Weight as of this encounter: 114 lb 4 oz (51.8 kg).           reports that she has never smoked. She has never used smokeless tobacco.      Counseling Resources:  ATP IV Guidelines  Pooled Cohorts Equation Calculator  Breast Cancer Risk Calculator  FRAX Risk Assessment  ICSI Preventive Guidelines  Dietary Guidelines for Americans, 2010  [employer]'s MyPlate  ASA Prophylaxis  Lung CA Screening    Kait Moss MD  [facility]  Answers for HPI/ROS submitted by the patient on 8/9/2018   PHQ-2 Score: 2    "

## 2018-08-09 NOTE — MR AVS SNAPSHOT
After Visit Summary   8/9/2018    Arlene Beck    MRN: [id number]           Patient Information     Date Of Birth          1955        Visit Information        Provider Department      8/9/2018 8:00 AM Kait Moss MD [facility]        Today's Diagnoses     Encounter for routine adult health examination without abnormal findings    -  1    Screen for colon cancer        Visit for screening mammogram        Screening for malignant neoplasm of cervix        Genital herpes simplex, unspecified site        Lipid screening          Care Instructions      Preventive Health Recommendations  Female Ages 50 - 64    Yearly exam: See your health care provider every year in order to  o Review health changes.   o Discuss preventive care.    o Review your medicines if your doctor has prescribed any.      Get a Pap test every three years (unless you have an abnormal result and your provider advises testing more often).    If you get Pap tests with HPV test, you only need to test every 5 years, unless you have an abnormal result.     You do not need a Pap test if your uterus was removed (hysterectomy) and you have not had cancer.    You should be tested each year for STDs (sexually transmitted diseases) if you're at risk.     Have a mammogram every 1 to 2 years.    Have a colonoscopy at age 50, or have a yearly FIT test (stool test). These exams screen for colon cancer.      Have a cholesterol test every 5 years, or more often if advised.    Have a diabetes test (fasting glucose) every three years. If you are at risk for diabetes, you should have this test more often.     If you are at risk for osteoporosis (brittle bone disease), think about having a bone density scan (DEXA).    Shots: Get a flu shot each year. Get a tetanus shot every 10 years.    Nutrition:     Eat at least 5 servings of fruits and vegetables each day.    Eat whole-grain bread, whole-wheat pasta and brown rice instead  of white grains and rice.    Get adequate Calcium and Vitamin D.     Lifestyle    Exercise at least 150 minutes a week (30 minutes a day, 5 days a week). This will help you control your weight and prevent disease.    Limit alcohol to one drink per day.    No smoking.     Wear sunscreen to prevent skin cancer.     See your dentist every six months for an exam and cleaning.    See your eye doctor every 1 to 2 years.            Follow-ups after your visit        Your next 10 appointments already scheduled     Sep 11, 2018  8:00 AM CDT   MA SCREENING DIGITAL BILATERAL with [facility]    [address], [address]  [place] [address] [phone]           Do not use any powder, lotion or deodorant under your arms or on your breast. If you do, we will ask you to remove it before your exam.  Wear comfortable, two-piece clothing.  If you have any allergies, tell your care team.  Bring any previous mammograms from other facilities or have them mailed to the breast center.              Future tests that were ordered for you today     Open Future Orders        Priority Expected Expires Ordered    MA SCREENING DIGITAL BILAT - Future  (s+30) Routine  8/9/2019 8/9/2018    Fecal colorectal cancer screen FIT - Future (S+30) Routine 8/30/2018 9/8/2018 8/9/2018            Who to contact     If you have questions or need follow up information about today's clinic visit or your schedule please contact [facility] directly at [phone].  Normal or non-critical lab and imaging results will be communicated to you by MyChart, letter or phone within 4 business days after the clinic has received the results. If you do not hear from us within 7 days, please contact the clinic through MyChart or phone. If you have a critical or abnormal lab result, we will notify you by phone as soon as possible.  Submit refill requests  "through [employer] or call your pharmacy and they will forward the refill request to us. Please allow 3 business days for your refill to be completed.          Additional Information About Your Visit        [employer] Information     [employer] gives you secure access to your electronic health record. If you see a primary care provider, you can also send messages to your care team and make appointments. If you have questions, please call your primary care clinic.  If you do not have a primary care provider, please call [phone] and they will assist you.        Care EveryWhere ID     This is your Care EveryWhere ID. This could be used by other organizations to access your [facility] medical records  [id number]        Your Vitals Were     Pulse Temperature Height Last Period Pulse Oximetry BMI (Body Mass Index)    77 98.6  F (37  C) (Oral) 5' 2.5\" (1.588 m) 04/20/2015 100% 20.56 kg/m2       Blood Pressure from Last 3 Encounters:   08/09/18 106/63   03/19/18 108/63   08/07/17 101/66    Weight from Last 3 Encounters:   08/09/18 114 lb 4 oz (51.8 kg)   03/19/18 113 lb (51.3 kg)   08/07/17 107 lb (48.5 kg)              We Performed the Following     BASIC METABOLIC PANEL     HPV High Risk Types DNA Cervical     Lipid panel reflex to direct LDL Fasting     Pap imaged thin layer screen with HPV - recommended age 30 - 65 years (select HPV order below)          Today's Medication Changes          These changes are accurate as of 8/9/18  8:50 AM.  If you have any questions, ask your nurse or doctor.               Start taking these medicines.        Dose/Directions    valACYclovir 500 MG tablet   Commonly known as:  VALTREX   Used for:  Genital herpes simplex, unspecified site   Started by:  Kait Moss MD        Dose:  500 mg   Take 1 tablet (500 mg) by mouth 2 times daily   Quantity:  180 tablet   Refills:  3            Where to get your medicines      Some of these will need a paper prescription and others can be bought " over the counter.  Ask your nurse if you have questions.     Bring a paper prescription for each of these medications     valACYclovir 500 MG tablet                Primary Care Provider Office Phone # Fax #    Kait Moss [phone] [address]  [facility] [address]        Equal Access to Services     CRUZ OSULLIVAN : Hadii aad ku hadasho Soomaali, waaxda luqadaha, qaybta kaalmada adeegyada, waxay amaliain haychavan sergey florezshelbymareilos antonio. So [facility] [phone].    ATENCIÓN: Si habla español, tiene a garcia disposición servicios gratuitos de asistencia lingüística. Ll[facility] [phone].    We comply with applicable federal civil rights laws and [place] laws. We do not discriminate on the basis of race, color, national origin, age, disability, sex, sexual orientation, or gender identity.            Thank you!     Thank you for choosing [facility]  for your care. Our goal is always to provide you with excellent care. Hearing back from our patients is one way we can continue to improve our services. Please take a few minutes to complete the written survey that you may receive in the mail after your visit with us. Thank you!             Your Updated Medication List - Protect others around you: Learn how to safely use, store and throw away your medicines at www.disposemymeds.org.          This list is accurate as of 8/9/18  8:50 AM.  Always use your most recent med list.                   Brand Name Dispense Instructions for use Diagnosis    multivitamin per tablet     100    one daily, Dr Silva's  super food, takes 4 daily    Routine general medical examination at a health care facility       NONFORMULARY           NONFORMULARY           Omega-3 & Omega-6 Fish Oil Caps       Routine general medical examination at a health care facility       valACYclovir 500 MG tablet    VALTREX    180 tablet    Take 1 tablet (500 mg) by mouth 2 times daily    Genital herpes simplex, unspecified  site       vitamin B complex with vitamin C Tabs tablet    STRESS TAB     takes two daily    Routine general medical examination at a health care facility

## 2018-08-12 PROCEDURE — 82274 ASSAY TEST FOR BLOOD FECAL: CPT | Performed by: FAMILY MEDICINE

## 2018-08-13 LAB
COPATH REPORT: NORMAL
PAP: NORMAL

## 2018-08-14 NOTE — PROGRESS NOTES
The results of your recent lipid (cholesterol) profile were abnormal.    Here are the results:  Lab Results       Component                Value               Date                       CHOL                     267                 08/09/2018            Lab Results       Component                Value               Date                       HDL                      93                  08/09/2018            Lab Results       Component                Value               Date                       LDL                      153                 08/09/2018            Lab Results       Component                Value               Date                       TRIG                     104                 08/09/2018            Lab Results       Component                Value               Date                       CHOLHDLRATIO             3.4                 07/03/2015              Desired or goal levels are:  CHOLESTEROL: Desirable is less than 200.   HDL (Good Cholesterol): Desirable is greater than 40 (for men) greater than 50 (for women).  LDL (Bad Cholesterol): Desirable is less than 130 (or less than 100 if you have heart disease or diabetes). Borderline 130-160.  TRIGLYCERIDES: Desirable is less than 150.  Borderline is 150-200.    To help lower your LDL (bad cholesterol) you could start adding ground flax seed to your diet. The recommended dose is 2 heaping tablespoonfuls daily, you may want to start with 1 tablespoonful and increase to 2 heaping tablespoonfuls. You can mix or add ground flax seed to hot cereals, yogurt, applesauce, cottage cheese or any sauce mixture that is your portion. Ground flax seed can be found in the baking aisle near the flour.      As you may know, an elevated cholesterol is one factor that increases your risk for heart disease and stroke. You can improve your cholesterol by controlling the amount and type of fat you eat and by increasing your daily activity level.    Here are some ways to  improve your nutrition:  Eat less fat (especially butter, Crisco and other saturated fats)  Buy lean cuts of meat, reduce your portions of red meat or substitute poultry or fish  Use skim milk and low-fat dairy products  Eat no more than 4 egg yolks per week  Avoid fried or fast foods that are high in fat  Eat more fruits and vegetables      Also consider starting or increasing your aerobic activity. Aerobic activity is the best way to improve HDL (good) cholesterol. If this would be new to you, please talk with me first about what activities are safe for you.      Other lab results:    The testing of your blood sugar, kidney function, and electrolytes was normal.      Please feel free to contact us with any questions or if you would like more information.    Kait Moss M.D.

## 2018-08-15 LAB
FINAL DIAGNOSIS: NORMAL
HPV HR 12 DNA CVX QL NAA+PROBE: NEGATIVE
HPV16 DNA SPEC QL NAA+PROBE: NEGATIVE
HPV18 DNA SPEC QL NAA+PROBE: NEGATIVE
SPECIMEN DESCRIPTION: NORMAL
SPECIMEN SOURCE CVX/VAG CYTO: NORMAL

## 2018-08-19 LAB — HEMOCCULT STL QL IA: NEGATIVE

## 2018-08-19 NOTE — PROGRESS NOTES
Lincoln Jacobs,  Thanks for completing the FIT stool test for colon cancer screening!  Your stool test is negative for microscopic (hidden) blood.  This test should be repeated annually.     Kait Carey MD for Dr. Kait Moss

## 2018-08-20 DIAGNOSIS — Z12.11 SCREEN FOR COLON CANCER: ICD-10-CM

## 2018-09-11 ENCOUNTER — HOSPITAL ENCOUNTER (OUTPATIENT)
Dept: MAMMOGRAPHY | Facility: CLINIC | Age: 63
Discharge: HOME | End: 2018-09-11
Attending: FAMILY MEDICINE | Admitting: FAMILY MEDICINE
Payer: COMMERCIAL

## 2018-09-11 DIAGNOSIS — Z12.31 VISIT FOR SCREENING MAMMOGRAM: ICD-10-CM

## 2018-09-11 PROCEDURE — 77063 BREAST TOMOSYNTHESIS BI: CPT

## 2018-12-22 ENCOUNTER — NURSE TRIAGE (OUTPATIENT)
Dept: NURSING | Facility: CLINIC | Age: 63
End: 2018-12-22

## 2018-12-22 ENCOUNTER — OFFICE VISIT (OUTPATIENT)
Dept: URGENT CARE | Facility: URGENT CARE | Age: 63
End: 2018-12-22
Payer: COMMERCIAL

## 2018-12-22 VITALS
DIASTOLIC BLOOD PRESSURE: 65 MMHG | OXYGEN SATURATION: 99 % | HEART RATE: 104 BPM | TEMPERATURE: 98.9 F | WEIGHT: 113 LBS | SYSTOLIC BLOOD PRESSURE: 134 MMHG | BODY MASS INDEX: 20.34 KG/M2

## 2018-12-22 DIAGNOSIS — J01.00 ACUTE MAXILLARY SINUSITIS, RECURRENCE NOT SPECIFIED: Primary | ICD-10-CM

## 2018-12-22 DIAGNOSIS — H10.023 OTHER MUCOPURULENT CONJUNCTIVITIS OF BOTH EYES: ICD-10-CM

## 2018-12-22 PROCEDURE — 99214 OFFICE O/P EST MOD 30 MIN: CPT | Performed by: FAMILY MEDICINE

## 2018-12-22 RX ORDER — POLYMYXIN B SULFATE AND TRIMETHOPRIM 1; 10000 MG/ML; [USP'U]/ML
1-2 SOLUTION OPHTHALMIC EVERY 4 HOURS
Qty: 6 ML | Refills: 0 | Status: SHIPPED | OUTPATIENT
Start: 2018-12-22 | End: 2019-01-01

## 2018-12-22 RX ORDER — AZITHROMYCIN 250 MG/1
TABLET, FILM COATED ORAL
Qty: 6 TABLET | Refills: 0 | Status: SHIPPED | OUTPATIENT
Start: 2018-12-22 | End: 2019-08-15

## 2018-12-23 NOTE — TELEPHONE ENCOUNTER
"  Caller states that she just started on augmentin today and her lip is swollen and her eyes are itchy   Thought she was calling  [facility]  When triage started  Stated\" I 'm just going back to urgent care then\" and abruptly ended call   Dee Dee Villar RN  FNA        Additional Information    Negative: Unresponsive, passed out or very weak    Negative: Swollen tongue    Negative: Difficulty breathing or wheezing    Negative: [1] Life-threatening reaction in the past to similar substance (e.g., food, insect bite/sting, chemical, etc.) AND [2] < 2 hours since exposure    Negative: Sounds like a life-threatening emergency to the triager    Negative: Followed a lip injury    Negative: Entire face is swollen    Negative: Followed an insect bite to the area    Negative: Cold sore suspected (sore on outer lip)    Negative: Mouth sores or ulcers on inner lip    Negative: [1] Contact with a chemical AND [2] some may have been swallowed    Negative: [1] Contact with a chemical AND [2] none entered the mouth    Negative: Taking an ACE Inhibitor medication  (e.g., benazepril/LOTENSIN, captopril/CAPOTEN, enalapril/VASOTEC, lisinopril/ZESTRIL)    Negative: [1] Severe swelling AND [2] cause unknown    Negative: Patient sounds very sick or weak to the triager    Rash while taking a medication or within 3 days of stopping it    Negative: [1] Life-threatening reaction (anaphylaxis) in the past to the same drug AND [2] < 2 hours since exposure    Negative: Difficulty breathing or wheezing    Negative: [1] Hoarseness or cough AND [2] started soon after 1st dose of drug    Negative: [1] Swollen tongue AND [2] started soon after 1st dose of drug    Negative: [1] Purple or blood-colored rash (spots or dots) AND [2] fever    Negative: Sounds like a life-threatening emergency to the triager    Negative: Rash is only on 1 part of the body (localized)    Negative: Taking new non-prescription (OTC) antihistamine, decongestant, ear drops, eye " drops, or other OTC cough/cold medicine    Negative: Taking new prescription antihistamine, allergy medicine, asthma medicine, eye drops, ear drops or nose drops    Negative: Rash started more than 3 days after stopping new prescription medicine    Negative: [1] Swelling is red AND [2] fever    Negative: [1] Swelling is painful to touch AND [2] fever    Negative: [1] Looks infected AND [2] large red area (> 2 in. or 5 cm)    Negative: Toothache    Negative: [1] Mild lip swelling from food reaction AND [2] diagnosis never confirmed by a HCP    Negative: Lip swelling lasts > 3 days    Negative: Lip swelling is a chronic symptom (recurrent or ongoing AND present > 4 weeks)    Mild lip swelling of unknown cause (all triage questions negative)    Protocols used: LIP SWELLING-ADULT-AH, MEDICATION QUESTION CALL-ADULT-AH, RASH - WIDESPREAD ON DRUGS-ADULT-AH

## 2019-08-13 ASSESSMENT — ENCOUNTER SYMPTOMS
EYE PAIN: 0
DYSURIA: 0
SHORTNESS OF BREATH: 0
SORE THROAT: 0
PARESTHESIAS: 0
DIARRHEA: 0
HEADACHES: 0
FREQUENCY: 0
CHILLS: 0
CONSTIPATION: 0
PALPITATIONS: 0
NERVOUS/ANXIOUS: 0
ABDOMINAL PAIN: 0
HEMATURIA: 0
JOINT SWELLING: 0
WEAKNESS: 0
FEVER: 0
DIZZINESS: 0
BREAST MASS: 0
ARTHRALGIAS: 1
NAUSEA: 0
MYALGIAS: 0
HEMATOCHEZIA: 0
HEARTBURN: 0
COUGH: 0

## 2019-08-15 ENCOUNTER — OFFICE VISIT (OUTPATIENT)
Dept: FAMILY MEDICINE | Facility: CLINIC | Age: 64
End: 2019-08-15
Payer: COMMERCIAL

## 2019-08-15 VITALS
HEIGHT: 63 IN | OXYGEN SATURATION: 100 % | DIASTOLIC BLOOD PRESSURE: 64 MMHG | BODY MASS INDEX: 19.62 KG/M2 | TEMPERATURE: 98.2 F | HEART RATE: 60 BPM | SYSTOLIC BLOOD PRESSURE: 108 MMHG | WEIGHT: 110.75 LBS

## 2019-08-15 DIAGNOSIS — A60.00 GENITAL HERPES SIMPLEX, UNSPECIFIED SITE: ICD-10-CM

## 2019-08-15 DIAGNOSIS — Z00.00 ROUTINE GENERAL MEDICAL EXAMINATION AT A HEALTH CARE FACILITY: Primary | ICD-10-CM

## 2019-08-15 LAB
ANION GAP SERPL CALCULATED.3IONS-SCNC: 5 MMOL/L (ref 3–14)
BUN SERPL-MCNC: 14 MG/DL (ref 7–30)
CALCIUM SERPL-MCNC: 9 MG/DL (ref 8.5–10.1)
CHLORIDE SERPL-SCNC: 110 MMOL/L (ref 94–109)
CHOLEST SERPL-MCNC: 240 MG/DL
CO2 SERPL-SCNC: 29 MMOL/L (ref 20–32)
CREAT SERPL-MCNC: 0.94 MG/DL (ref 0.52–1.04)
GFR SERPL CREATININE-BSD FRML MDRD: 65 ML/MIN/{1.73_M2}
GLUCOSE SERPL-MCNC: 80 MG/DL (ref 70–99)
HDLC SERPL-MCNC: 87 MG/DL
LDLC SERPL CALC-MCNC: 133 MG/DL
NONHDLC SERPL-MCNC: 153 MG/DL
POTASSIUM SERPL-SCNC: 4 MMOL/L (ref 3.4–5.3)
SODIUM SERPL-SCNC: 144 MMOL/L (ref 133–144)
TRIGL SERPL-MCNC: 99 MG/DL

## 2019-08-15 PROCEDURE — 99396 PREV VISIT EST AGE 40-64: CPT | Performed by: FAMILY MEDICINE

## 2019-08-15 PROCEDURE — 80061 LIPID PANEL: CPT | Performed by: FAMILY MEDICINE

## 2019-08-15 PROCEDURE — 80048 BASIC METABOLIC PNL TOTAL CA: CPT | Performed by: FAMILY MEDICINE

## 2019-08-15 PROCEDURE — 36415 COLL VENOUS BLD VENIPUNCTURE: CPT | Performed by: FAMILY MEDICINE

## 2019-08-15 RX ORDER — VALACYCLOVIR HYDROCHLORIDE 500 MG/1
500 TABLET, FILM COATED ORAL 2 TIMES DAILY
Qty: 180 TABLET | Refills: 3 | Status: SHIPPED | OUTPATIENT
Start: 2019-08-15 | End: 2020-10-12

## 2019-08-15 ASSESSMENT — ENCOUNTER SYMPTOMS
PALPITATIONS: 0
BREAST MASS: 0
HEMATOCHEZIA: 0
ABDOMINAL PAIN: 0
SORE THROAT: 0
DYSURIA: 0
CONSTIPATION: 0
NERVOUS/ANXIOUS: 0
JOINT SWELLING: 0
MYALGIAS: 0
SHORTNESS OF BREATH: 0
HEADACHES: 0
WEAKNESS: 0
DIZZINESS: 0
HEMATURIA: 0
PARESTHESIAS: 0
FEVER: 0
NAUSEA: 0
COUGH: 0
EYE PAIN: 0
CHILLS: 0
FREQUENCY: 0
ARTHRALGIAS: 1
HEARTBURN: 0
DIARRHEA: 0

## 2019-08-15 ASSESSMENT — MIFFLIN-ST. JEOR: SCORE: 1033.07

## 2019-08-15 NOTE — PATIENT INSTRUCTIONS

## 2019-08-15 NOTE — PROGRESS NOTES
SUBJECTIVE:   CC: Arlene Beck is an 64 year old woman who presents for preventive health visit.     Healthy Habits:     Getting at least 3 servings of Calcium per day:  Yes    Bi-annual eye exam:  Yes    Dental care twice a year:  Yes    Sleep apnea or symptoms of sleep apnea:  None    Diet:  Gluten-free/reduced    Frequency of exercise:  2-3 days/week    Duration of exercise:  45-60 minutes    Taking medications regularly:  Yes    Medication side effects:  None    PHQ-2 Total Score: 0    Additional concerns today:  No      Today's PHQ-2 Score:   PHQ-2 ( 1999 Pf[employer]) 8/13/2019   Q1: Little interest or pleasure in doing things 0   Q2: Feeling down, depressed or hopeless 0   PHQ-2 Score 0   Q1: Little interest or pleasure in doing things Not at all   Q2: Feeling down, depressed or hopeless Not at all   PHQ-2 Score 0       Abuse: Current or Past(Physical, Sexual or Emotional)- No  Do you feel safe in your environment? Yes    Social History     Tobacco Use     Smoking status: Never Smoker     Smokeless tobacco: Never Used   Substance Use Topics     Alcohol use: Yes     Comment: I have reduced all sugar consumption since January 2017     If you drink alcohol do you typically have >3 drinks per day or >7 drinks per week? No    No flowsheet data found.    Reviewed orders with patient.  Reviewed health maintenance and updated orders accordingly - Yes  BP Readings from Last 3 Encounters:   08/15/19 108/64   12/22/18 134/65   08/09/18 106/63    Wt Readings from Last 3 Encounters:   08/15/19 50.2 kg (110 lb 12 oz)   12/22/18 51.3 kg (113 lb)   08/09/18 51.8 kg (114 lb 4 oz)                  Patient Active Problem List   Diagnosis     Genital herpes     Irritable bowel syndrome     CARDIOVASCULAR SCREENING; LDL GOAL LESS THAN 160     Advanced directives, counseling/discussion     Fatigue     Well woman exam (no gynecological exam)     Raynaud's disease without gangrene     Past Surgical History:   Procedure Laterality  Date     C APPENDECTOMY  1967     C EXCISION OF LINGUAL TONSIL  1963     LAPAROSCOPY PROCEDURE UNLISTED  1975    2 times, for pain      TUBAL LIGATION  2002       Social History     Tobacco Use     Smoking status: Never Smoker     Smokeless tobacco: Never Used   Substance Use Topics     Alcohol use: Yes     Comment: I have reduced all sugar consumption since January 2017     Family History   Problem Relation Age of Onset     Eye Disorder Mother         glaucoma     Allergies Mother      Breast Cancer Mother         double mastectomy     Coronary Artery Disease Mother         She had a stint put into her heart in March 2019     Other Cancer Mother         Breast cancer     C.A.D. Father      Cancer Father         prostate     Allergies Father      Heart Disease Father      Diabetes Father      Hypertension Father      Cerebrovascular Disease Father      Prostate Cancer Father      Asthma Maternal Grandmother      Osteoporosis Maternal Grandmother      Thyroid Disease Maternal Grandmother      Hypertension Sister      Gastrointestinal Disease Sister         hyperplasia     Lipids Brother         tachycardia, knee and joint problems         Current Outpatient Medications   Medication Sig Dispense Refill     MULTIVITAMINS OR TABS one daily, Dr Silva's  super food, takes 4 daily 100 1 YEAR     OMEGA-3 & OMEGA-6 FISH OIL OR CAPS   0     valACYclovir (VALTREX) 500 MG tablet Take 1 tablet (500 mg) by mouth 2 times daily 180 tablet 3     VITAMIN B COMPLEX OR TABS takes two daily  0     Allergies   Allergen Reactions     Codeine      Sulfa Drugs      Recent Labs   Lab Test 08/09/18  0852 08/07/17  0931 07/28/16  0855 08/24/15  0917 07/03/15  0755 08/01/14  1031   * 150* 120*  --  131*  --    HDL 93 76 94  --  64  --    TRIG 104 65 73  --  105  --    ALT  --   --   --  19  --  22   CR 0.81 0.89 0.89 0.80  --  0.87   GFRESTIMATED 72 65 65 73  --  67   GFRESTBLACK 87 78 79 88  --  81   POTASSIUM 3.9 4.1 4.0 4.0  --  3.7    TSH  --   --   --   --  1.29 0.84        Mammogram Screening: Patient over age 50, mutual decision to screen reflected in health maintenance.    Pertinent mammograms are reviewed under the imaging tab.  History of abnormal Pap smear:   Last 3 Pap and HPV Results:   PAP / HPV Latest Ref Rng & Units 8/9/2018 7/3/2015 7/24/2012   PAP - NIL NIL NIL   HPV 16 DNA NEG:Negative Negative - -   HPV 18 DNA NEG:Negative Negative - -   OTHER HR HPV NEG:Negative Negative - -     PAP / HPV Latest Ref Rng & Units 8/9/2018 7/3/2015 7/24/2012   PAP - NIL NIL NIL   HPV 16 DNA NEG:Negative Negative - -   HPV 18 DNA NEG:Negative Negative - -   OTHER HR HPV NEG:Negative Negative - -     Reviewed and updated as needed this visit by clinical staff  Tobacco  Allergies  Meds         Reviewed and updated as needed this visit by Provider        Past Medical History:   Diagnosis Date     COPD (chronic obstructive pulmonary disease) (H)     I'm not sure if what I get rises to this condition     Fatigue 7/3/2015     Genital herpes, unspecified      Irritable bowel syndrome      Uncomplicated asthma     I have had ALLERGIC reaction asthma      Past Surgical History:   Procedure Laterality Date     C APPENDECTOMY  1967     C EXCISION OF LINGUAL TONSIL  1963     LAPAROSCOPY PROCEDURE UNLISTED  1975    2 times, for pain      TUBAL LIGATION  2002       Review of Systems   Constitutional: Negative for chills and fever.   HENT: Negative for congestion, ear pain, hearing loss and sore throat.    Eyes: Negative for pain and visual disturbance.   Respiratory: Negative for cough and shortness of breath.    Cardiovascular: Negative for chest pain, palpitations and peripheral edema.   Gastrointestinal: Negative for abdominal pain, constipation, diarrhea, heartburn, hematochezia and nausea.   Breasts:  Negative for tenderness, breast mass and discharge.   Genitourinary: Positive for pelvic pain. Negative for dysuria, frequency, genital sores, hematuria,  "urgency, vaginal bleeding and vaginal discharge.   Musculoskeletal: Positive for arthralgias. Negative for joint swelling and myalgias.   Skin: Negative for rash.   Neurological: Negative for dizziness, weakness, headaches and paresthesias.   Psychiatric/Behavioral: Negative for mood changes. The patient is not nervous/anxious.            OBJECTIVE:   /64 (BP Location: Left arm, Patient Position: Sitting, Cuff Size: Adult Small)   Pulse 60   Temp 98.2  F (36.8  C) (Oral)   Ht 1.603 m (5' 3.1\")   Wt 50.2 kg (110 lb 12 oz)   LMP 04/20/2015   SpO2 100%   BMI 19.56 kg/m    Physical Exam  GENERAL APPEARANCE: healthy, alert and no distress  EYES: Eyes grossly normal to inspection, PERRL and conjunctivae and sclerae normal  HENT: ear canals and TM's normal, nose and mouth without ulcers or lesions, oropharynx clear and oral mucous membranes moist  NECK: no adenopathy, no asymmetry, masses, or scars and thyroid normal to palpation  RESP: lungs clear to auscultation - no rales, rhonchi or wheezes  BREAST: normal without masses, tenderness or nipple discharge and no palpable axillary masses or adenopathy  CV: regular rate and rhythm, normal S1 S2, no S3 or S4, no murmur, click or rub, no peripheral edema and peripheral pulses strong  ABDOMEN: soft, nontender, no hepatosplenomegaly, no masses and bowel sounds normal  MS: no musculoskeletal defects are noted and gait is age appropriate without ataxia  SKIN: no suspicious lesions or rashes  NEURO: Normal strength and tone, sensory exam grossly normal, mentation intact and speech normal  PSYCH: mentation appears normal and affect normal/bright    Diagnostic Test Results:  Labs reviewed in Epic    ASSESSMENT/PLAN:   1. Routine general medical examination at a health care facility  Pap with HPV cotesting  completed in 2018 and was normal. Pap due in 2023  She will schedule mammogram--she is considering waiting until it has been two years   She will complete FIT test  - " "Lipid panel reflex to direct LDL Fasting    2. Genital herpes simplex, unspecified site  Refilled valtrex today   - Basic metabolic panel    COUNSELING:  Reviewed preventive health counseling, as reflected in patient instructions    Estimated body mass index is 19.56 kg/m  as calculated from the following:    Height as of this encounter: 1.603 m (5' 3.1\").    Weight as of this encounter: 50.2 kg (110 lb 12 oz).         reports that she has never smoked. She has never used smokeless tobacco.      Counseling Resources:  ATP IV Guidelines  Pooled Cohorts Equation Calculator  Breast Cancer Risk Calculator  FRAX Risk Assessment  ICSI Preventive Guidelines  Dietary Guidelines for Americans, 2010  [employer]'s MyPlate  ASA Prophylaxis  Lung CA Screening    Kait Moss MD   [facility]  "

## 2019-08-15 NOTE — RESULT ENCOUNTER NOTE
Chen RiveraRyan Beck,  Your results came back as follows -LDL(bad) cholesterol level is elevated which can increase your heart disease risk.  A diet high in fat and simple carbohydrates, genetics and being overweight can contribute to this. ADVISE: exercising 150 minutes of aerobic exercise per week (30 minutes for 5 days per week or 50 minutes for 3 days per week are options) and eating a low saturated fat/low carbohydrate diet are helpful to improve this. In 12 months, you should recheck your fasting cholesterol panel   The 10-year ASCVD risk score (Wattsburgjhonathan FREEMAN Jr., et al., 2013) is: 2.5%    Values used to calculate the score:      Age: 64 years      Sex: Female      Is Non- : No      Diabetic: No      Tobacco smoker: No      Systolic Blood Pressure: 108 mmHg      Is BP treated: No      HDL Cholesterol: 87 mg/dL      Total Cholesterol: 240 mg/dL  -Kidney function is normal (Cr, GFR), Sodium is normal, Potassium is normal, Calcium is normal, Glucose is normal. . If you have any further concerns please do not hesitate to contact us by message, phone or making an appointment.  Have a good day   Dr Ramos SALAZAR

## 2019-08-19 DIAGNOSIS — Z00.00 ROUTINE GENERAL MEDICAL EXAMINATION AT A HEALTH CARE FACILITY: ICD-10-CM

## 2019-08-19 PROCEDURE — 82274 ASSAY TEST FOR BLOOD FECAL: CPT | Performed by: FAMILY MEDICINE

## 2019-08-21 LAB — HEMOCCULT STL QL IA: NEGATIVE

## 2019-10-04 ENCOUNTER — HEALTH MAINTENANCE LETTER (OUTPATIENT)
Age: 64
End: 2019-10-04

## 2020-02-09 ENCOUNTER — HEALTH MAINTENANCE LETTER (OUTPATIENT)
Age: 65
End: 2020-02-09

## 2020-08-07 ENCOUNTER — HOSPITAL ENCOUNTER (OUTPATIENT)
Dept: MAMMOGRAPHY | Facility: CLINIC | Age: 65
Discharge: HOME | End: 2020-08-07
Attending: FAMILY MEDICINE | Admitting: FAMILY MEDICINE
Payer: COMMERCIAL

## 2020-08-07 DIAGNOSIS — Z00.00 PREVENTATIVE HEALTH CARE: ICD-10-CM

## 2020-08-07 PROCEDURE — 77067 SCR MAMMO BI INCL CAD: CPT

## 2020-10-07 ENCOUNTER — TELEPHONE (OUTPATIENT)
Dept: FAMILY MEDICINE | Facility: CLINIC | Age: 65
End: 2020-10-07

## 2020-10-07 NOTE — TELEPHONE ENCOUNTER
Dr Moss- see below note. Would you like pt to do oncare and move her appointment with you to virtual or reschedule? Sharri Calderon RN

## 2020-10-07 NOTE — TELEPHONE ENCOUNTER
Patient called stating she has an appt scheduled on 10/12/20 however, last night patient throat got scratchy and so wondering needs a covid test?    Patient states she was told she could do oncare or ask pcp since an appt is scheduled to see what pcp wants    Please advise and ok to leave a message

## 2020-10-08 ENCOUNTER — MYC MEDICAL ADVICE (OUTPATIENT)
Dept: FAMILY MEDICINE | Facility: CLINIC | Age: 65
End: 2020-10-08

## 2020-10-08 ENCOUNTER — VIRTUAL VISIT (OUTPATIENT)
Dept: FAMILY MEDICINE | Facility: CLINIC | Age: 65
End: 2020-10-08
Payer: COMMERCIAL

## 2020-10-08 ENCOUNTER — NURSE TRIAGE (OUTPATIENT)
Dept: NURSING | Facility: CLINIC | Age: 65
End: 2020-10-08

## 2020-10-08 VITALS — HEIGHT: 63 IN | WEIGHT: 110 LBS | BODY MASS INDEX: 19.49 KG/M2

## 2020-10-08 DIAGNOSIS — J06.9 UPPER RESPIRATORY TRACT INFECTION, UNSPECIFIED TYPE: Primary | ICD-10-CM

## 2020-10-08 PROCEDURE — 99213 OFFICE O/P EST LOW 20 MIN: CPT | Mod: TEL | Performed by: PREVENTIVE MEDICINE

## 2020-10-08 ASSESSMENT — MIFFLIN-ST. JEOR: SCORE: 1015.15

## 2020-10-08 NOTE — TELEPHONE ENCOUNTER
Please have her do oncare for her throat symptoms. Given she has upper respiratory symptoms, she should not be seen inside clinic -- please also reschedule her Monday appointment as a virtual appointment with me and we can discuss scheduling any labs needed when she is feeling better. Kait Moss M.D.

## 2020-10-08 NOTE — PROGRESS NOTES
"Arlene Beck is a 65 year old female who is being evaluated via a billable telephone visit.      The patient has been notified of following:     \"This telephone visit will be conducted via a call between you and your physician/provider. We have found that certain health care needs can be provided without the need for a physical exam.  This service lets us provide the care you need with a short phone conversation.  If a prescription is necessary we can send it directly to your pharmacy.  If lab work is needed we can place an order for that and you can then stop by our lab to have the test done at a later time.    Telephone visits are billed at different rates depending on your insurance coverage. During this emergency period, for some insurers they may be billed the same as an in-person visit.  Please reach out to your insurance provider with any questions.    If during the course of the call the physician/provider feels a telephone visit is not appropriate, you will not be charged for this service.\"    Patient has given verbal consent for Telephone visit?  Yes    What phone number would you like to be contacted at? [phone]    How would you like to obtain your AVS? Rigo    Subjective     Arlene Beck is a 65 year old female who presents via phone visit today for the following health issues:    HPI     Acute Illness  Onset/Duration: 4 days  Symptoms:  Fever: no  Chills/Sweats: no  Headache (location?): YES- all over  Sinus Pressure: no  Conjunctivitis:  no  Ear Pain: no  Rhinorrhea: no  Congestion: no  Sore Throat: no  Cough: YES - only when laying flat  Wheeze: no  Decreased Appetite: YES  Nausea: no  Vomiting: no  Diarrhea: no  Dysuria/Freq.: no  Dysuria or Hematuria: no  Fatigue/Achiness: YES  Sick/Strep Exposure: no  Therapies tried and outcome: tiger balm, ibuprofen- does not help      No fever, sob, cp  No travel, covid contacts  No rhinorrhea or post nasal drip  No ear pain or sinus " discomfort  Has headache.  Some cervical neck discomfort.   No trauma.    Her partner coaches a high school tennis team in [facility].  Her partner has no covid contacts either.    Patient rotates with her sisters taking care of their mom who is on home hospice to due heart failure.      Past Medical History:   Diagnosis Date     COPD (chronic obstructive pulmonary disease) (H)     I'm not sure if what I get rises to this condition     Fatigue 7/3/2015     Genital herpes, unspecified      Irritable bowel syndrome      Uncomplicated asthma     I have had ALLERGIC reaction asthma     Social History     Socioeconomic History     Marital status: Single     Spouse name: Not on file     Number of children: 0     Years of education: Not on file     Highest education level: Not on file   Occupational History     Occupation:      Employer: [employer]     Employer: [employer]   Social Needs     Financial resource strain: Not on file     Food insecurity     Worry: Not on file     Inability: Not on file     Transportation needs     Medical: Not on file     Non-medical: Not on file   Tobacco Use     Smoking status: Never Smoker     Smokeless tobacco: Never Used   Substance and Sexual Activity     Alcohol use: Yes     Comment: I have reduced all sugar consumption since January 2017     Drug use: No     Sexual activity: Yes     Partners: Male     Birth control/protection: Post-menopausal   Lifestyle     Physical activity     Days per week: Not on file     Minutes per session: Not on file     Stress: Not on file   Relationships     Social connections     Talks on phone: Not on file     Gets together: Not on file     Attends [employer] service: Not on file     Active member of club or organization: Not on file     Attends meetings of clubs or organizations: Not on file     Relationship status: Not on file     Intimate partner violence     Fear of current or ex partner: Not on file      Emotionally abused: Not on file     Physically abused: Not on file     Forced sexual activity: Not on file   Other Topics Concern      Service No     Blood Transfusions No     Caffeine Concern Yes     Occupational Exposure No     Hobby Hazards Yes     Comment: motorcycle--wear protective gear     Sleep Concern Yes     Comment: occ insomnia--stress related     Stress Concern Yes     Weight Concern No     Special Diet No     Back Care Yes     Comment: back exercises     Exercise No     Bike Helmet Yes     Seat Belt Yes     Self-Exams Yes     Parent/sibling w/ CABG, MI or angioplasty before 65F 55M? Yes     Comment: father   Social History Narrative    Balanced Diet - Yes    Osteoporosis Prevention Measures - Dairy servings per day: 2 servings daily     Regular Exercise -  Yes Describe walks x a week, tennis, golf     Dental Exam - YES - Date: 6 months ago    Eye Exam - YES - Date: 12-08    Self Breast Exam - Yes    Abuse: Current or Past (Physical, Sexual or Emotional)- No    Do you feel safe in your environment - Yes    Guns stored in the home - No    Sunscreen used - Yes    Seatbelts used - Yes    Lipids -  YES - Date: 5-07    Glucose -  YES - Date: 5-07    Colon Cancer Screening - No    Hemoccults - NO    Pap Test -  YES - Date: 5-08    Do you have any concerns about STD's -  No    Mammography - YES - Date: has one today    DEXA - NO    Immunizations reviewed and up to date - Yes, tdap 1-07 5-28-09  DELORES Ramirez CMA                 Family History   Problem Relation Age of Onset     Eye Disorder Mother         glaucoma     Allergies Mother      Breast Cancer Mother         double mastectomy     Coronary Artery Disease Mother         She had a stint put into her heart in March 2019     Other Cancer Mother         Breast cancer     Thyroid Disease Mother         Low functioning thyroid     C.A.D. Father      Cancer Father         prostate     Allergies Father      Heart Disease Father      Diabetes Father       Hypertension Father      Cerebrovascular Disease Father      Prostate Cancer Father      Asthma Maternal Grandmother      Osteoporosis Maternal Grandmother      Thyroid Disease Maternal Grandmother      Hypertension Sister      Gastrointestinal Disease Sister         hyperplasia     Lipids Brother         tachycardia, knee and joint problems           Review of Systems   Constitutional, HEENT, cardiovascular, pulmonary, GI, , musculoskeletal, neuro, skin, endocrine and psych systems are negative, except as otherwise noted.       Objective          Vitals:  No vitals were obtained today due to virtual visit.    healthy, alert and no distress  PSYCH: Alert and oriented times 3; coherent speech, normal   rate and volume, able to articulate logical thoughts, able   to abstract reason, no tangential thoughts, no hallucinations   or delusions  Her affect is normal and pleasant  RESP: No cough, no audible wheezing, able to talk in full sentences  Remainder of exam unable to be completed due to telephone visits            Assessment/Plan:    Assessment & Plan     Viral illness - will check covid, isolate until covid negative and feel better  -zinc, tylenol, rest, fluids  Follow up if not improving      Patient preferred to hold off on health maintenance today.    See Patient Instructions    Zach Savage MD  [facility]    Phone call duration:  15 minutes

## 2020-10-08 NOTE — TELEPHONE ENCOUNTER
Pt notified-Virtual appointment made with Dr Savage today as she was unable to get into oncare. Appointment with Dr Moss changed to video. Sharri Calderon RN

## 2020-10-08 NOTE — PATIENT INSTRUCTIONS
Viral illness - will check covid, isolate until covid negative and feel better  -zinc, tylenol, rest, fluids  Follow up if not improving

## 2020-10-08 NOTE — TELEPHONE ENCOUNTER
Clinic Action Needed: Yes, follow up needed  FNA Triage Call  Presenting Problem:    Arlene requests:    1) COVID testing orders   Has hoarseness of voice, dry coug, headache, eye pain, fatigue. Symptoms started Tuesday 10/6. No known exposure to COVID  Has tried Oncare but does not work for her, login issues. See [employer] message.  Requests same-day testing, FNA unable to guarantee but explained that this is something she can discuss with the  if testing has been ordered.    2) Follow up with Dr. Moss. She has a clinic appointment on Monday 10/12. Asks if PCP will be comfortable seeing her if she has respiratory symptoms.    Routed to:  [facility] [[id number]]    Please be sure to close this encounter once this patient's issue/question has been addressed.      Kerry Epstein RN/[facility] Nurse Advisor        Reason for Disposition    [1] Continuous (nonstop) coughing interferes with work or school AND [2] no improvement using cough treatment per protocol    Additional Information    Negative: SEVERE difficulty breathing (e.g., struggling for each breath, speaks in single words)    Negative: Difficult to awaken or acting confused (e.g., disoriented, slurred speech)    Negative: Bluish (or gray) lips or face now    Negative: Shock suspected (e.g., cold/pale/clammy skin, too weak to stand, low BP, rapid pulse)    Negative: Sounds like a life-threatening emergency to the triager    Negative: SEVERE or constant chest pain or pressure (Exception: mild central chest pain, present only when coughing)    Negative: MODERATE difficulty breathing (e.g., speaks in phrases, SOB even at rest, pulse 100-120)    Negative: Patient sounds very sick or weak to the triager    Negative: MILD difficulty breathing (e.g., minimal/no SOB at rest, SOB with walking, pulse <100)    Negative: Chest pain or pressure    Negative: Fever > 103 F (39.4 C)    Negative: [1] Fever > 101 F (38.3 C) AND [2] age > 60    Negative: [1]  Fever > 100.0 F (37.8 C) AND [2] bedridden (e.g., nursing home patient, CVA, chronic illness, recovering from surgery)    Negative: HIGH RISK patient (e.g., age > 64 years, diabetes, heart or lung disease, weak immune system) (Exception: Has already been evaluated by healthcare provider and has no new or worsening symptoms)    Negative: Fever present > 3 days (72 hours)    Negative: [1] Fever returns after gone for over 24 hours AND [2] symptoms worse or not improved    Protocols used: CORONAVIRUS (COVID-19) DIAGNOSED OR [id number] 8.4.20

## 2020-10-09 DIAGNOSIS — J06.9 UPPER RESPIRATORY TRACT INFECTION, UNSPECIFIED TYPE: ICD-10-CM

## 2020-10-09 PROCEDURE — U0003 INFECTIOUS AGENT DETECTION BY NUCLEIC ACID (DNA OR RNA); SEVERE ACUTE RESPIRATORY SYNDROME CORONAVIRUS 2 (SARS-COV-2) (CORONAVIRUS DISEASE [COVID-19]), AMPLIFIED PROBE TECHNIQUE, MAKING USE OF HIGH THROUGHPUT TECHNOLOGIES AS DESCRIBED BY CMS-2020-01-R: HCPCS | Performed by: PREVENTIVE MEDICINE

## 2020-10-10 LAB
SARS-COV-2 RNA SPEC QL NAA+PROBE: NOT DETECTED
SPECIMEN SOURCE: NORMAL

## 2020-10-12 ENCOUNTER — VIRTUAL VISIT (OUTPATIENT)
Dept: FAMILY MEDICINE | Facility: CLINIC | Age: 65
End: 2020-10-12
Payer: COMMERCIAL

## 2020-10-12 DIAGNOSIS — A60.00 GENITAL HERPES SIMPLEX, UNSPECIFIED SITE: ICD-10-CM

## 2020-10-12 DIAGNOSIS — L40.9 PSORIASIS: Primary | ICD-10-CM

## 2020-10-12 DIAGNOSIS — M25.561 RIGHT KNEE PAIN, UNSPECIFIED CHRONICITY: ICD-10-CM

## 2020-10-12 PROCEDURE — 99214 OFFICE O/P EST MOD 30 MIN: CPT | Mod: GT | Performed by: FAMILY MEDICINE

## 2020-10-12 RX ORDER — VALACYCLOVIR HYDROCHLORIDE 500 MG/1
500 TABLET, FILM COATED ORAL 2 TIMES DAILY
Qty: 180 TABLET | Refills: 3 | Status: SHIPPED | OUTPATIENT
Start: 2020-10-12 | End: 2021-10-04

## 2020-10-12 RX ORDER — KETOCONAZOLE 20 MG/ML
SHAMPOO TOPICAL DAILY PRN
Qty: 120 ML | Refills: 1 | Status: SHIPPED | OUTPATIENT
Start: 2020-10-12 | End: 2021-10-04

## 2020-10-12 NOTE — PATIENT INSTRUCTIONS
"1) Schedule with ortho and physical therapy for your right knee pain (wait until you are feeling better before you go in for a visit).   2) Let me know in the next few days if you are not feeling better and would like to repeat a COVID test.   3) I refilled your valtrex and ketoconazole shampoo. Schedule a lab visit when you are feeling better to have a metabolic panel drawn for medication monitoring.     Discharge Instructions for COVID-19 Patients  You have--or may have--COVID-19. Please follow the instructions listed below.   If you have a weakened immune system, discuss with your doctor any other actions you need to take.  How can I protect others?  If you have symptoms (fever, cough, body aches or trouble breathing):    Stay home and away from others (self-isolate) until:  ? At least 10 days have passed since your symptoms started, And   ? You've had no fever--and no medicine that reduces fever--for 1 full day (24 hours), And    ? Your other symptoms have resolved (gotten better).  If you don't show symptoms, but testing showed that you have COVID-19:    Stay home and away from others (self-isolate). Follow the tips under \"How do I self-isolate?\" below for 10 days (20 days if you have a weak immune system).    You don't need to be retested for COVID-19 before going back to school or work. As long as you're fever-free and feeling better, you can go back to school, work and other activities after waiting the 10 or 20 days.   How do I self-isolate?    Stay in your own room, even for meals. Use your own bathroom if you can.    Stay away from others in your home. No hugging, kissing or shaking hands. No visitors.    Don't go to work, school or anywhere else.    Clean \"high touch\" surfaces often (doorknobs, counters, handles). Use household cleaning spray or wipes. You'll find a full list of  on the EPA website: www.epa.gov/pesticide-registration/list-n-disinfectants-use-against-sars-cov-2.    Cover your " mouth and nose with a mask or other face covering to avoid spreading germs.    Wash your hands and face often. Use soap and water.    Caregivers in these groups are at risk for severe illness due to COVID-19:  ? People 65 years and older  ? People who live in a nursing home or long-term care facility  ? People with chronic disease (lung, heart, cancer, diabetes, kidney, liver, immunologic)  ? People who have a weakened immune system, including those who:    Are in cancer treatment    Take medicine that weakens the immune system, such as corticosteroids    Had a bone marrow or organ transplant    Have an immune deficiency    Have poorly controlled HIV or AIDS    Are obese (body mass index of 40 or higher)    Smoke regularly    Caregivers should wear gloves while washing dishes, handling laundry and cleaning bedrooms and bathrooms.    Use caution when washing and drying laundry: Don't shake dirty laundry and use the warmest water setting that you can.    For more tips on managing your health at home, go to www.cdc.gov/coronavirus/2019-ncov/downloads/10Things.pdf.  How can I take care of myself at home?  1. Get lots of rest. Drink extra fluids (unless a doctor has told you not to).    2. Take Tylenol (acetaminophen) for fever or pain. If you have liver or kidney problems, ask your family doctor if it's okay to take Tylenol.     Adults can take either:  ? 650 mg (two 325 mg pills) every 4 to 6 hours, or   ? 1,000 mg (two 500 mg pills) every 8 hours as needed.  ? Note: Don't take more than 3,000 mg in one day. Acetaminophen is found in many medicines (both prescribed and over-the-counter medicines). Read all labels to be sure you don't take too much.   For children, check the Tylenol bottle for the right dose. The dose is based on the child's age or weight.  3. If you have other health problems (like cancer, heart failure, an organ transplant or severe kidney disease): Call your specialty clinic if you don't feel better  in the next 2 days.    4. Know when to call 911. Emergency warning signs include:  ? Trouble breathing or shortness of breath  ? Pain or pressure in the chest that doesn't go away  ? Feeling confused like you haven't felt before, or not being able to wake up  ? Bluish-colored lips or face    5. Your doctor may have prescribed a blood thinner medicine. Follow their instructions.  Where can I get more information?    [facility] - About COVID-19: [email or web address]    CDC - What to Do If You're Sick: www.cdc.gov/coronavirus/2019-ncov/about/steps-when-sick.html    CDC - Ending Home Isolation: www.cdc.gov/coronavirus/2019-ncov/hcp/disposition-in-home-patients.html    CDC - Caring for Someone: www.cdc.gov/coronavirus/2019-ncov/if-you-are-sick/care-for-someone.html    [facility] - Interim Guidance for Hospital Discharge to Home: www.[facility].[place]./diseases/coronavirus/hcp/hospdischarge.pdf    [facility] clinical trials (COVID-19 research studies): [email or web address].[email or web address]-clinical-trials    Below are the COVID-19 hotlines at the [facility] ([facility]). Interpreters are available.  ? For health questions: Call 648-729-2607 or 1-927.322.5772 (7 a.m. to 7 p.m.)  ? For questions about schools and childcare: Call 250-379-5163 or 1-375.742.2910 (7 a.m. to 7 p.m.)    For informational purposes only. Not to replace the advice of your health care provider. Clinically reviewed by the Infection Prevention Team. Copyright   2020 [employer]. All rights reserved. [employer] 684428 - REV 08/04/20.

## 2020-10-12 NOTE — PROGRESS NOTES
"Arlene Beck is a 65 year old female who is being evaluated via a billable video visit.      The patient has been notified of following:     \"This video visit will be conducted via a call between you and your physician/provider. We have found that certain health care needs can be provided without the need for an in-person physical exam.  This service lets us provide the care you need with a video conversation.  If a prescription is necessary we can send it directly to your pharmacy.  If lab work is needed we can place an order for that and you can then stop by our lab to have the test done at a later time.    Video visits are billed at different rates depending on your insurance coverage.  Please reach out to your insurance provider with any questions.    If during the course of the call the physician/provider feels a video visit is not appropriate, you will not be charged for this service.\"    Patient has given verbal consent for Video visit? Yes  How would you like to obtain your AVS? MyChart  If you are dropped from the video visit, the video invite should be resent to: Text to cell phone: [phone] (M)   Will anyone else be joining your video visit? No     Subjective     Arlene Beck is a 65 year old female who presents today via video visit for the following health issues:    HPI        Refill on valACYclovir (VALTREX) 500 MG tablet  Refill on shampoo ketocomazole 2% hsam     Musculoskeletal problem/pain  Onset/Duration: Tuesday- general muscle pain   Description  Location: knee - right  Joint Swelling: no  Redness: no  Pain: YES  Warmth: no  Intensity:  mild  Progression of Symptoms:  constant  Accompanying signs and symptoms:   Fevers: YES low grade   Numbness/tingling/weakness: sometimes when she is getting up from her desk her leg feels odd, she has not really been thinking about it    History  Trauma to the area: no  Recent illness:  no  Previous similar problem: no  Previous evaluation:  " no  Precipitating or alleviating factors:  Aggravating factors include: bending   Therapies tried and outcome: topical ointment, ibuprofen prn mostly at bedtime        Video Start Time: 4:26 PM      In the spring/summer, she began to notice some right knee pain. It occurs when she is walking briskly and keeps her from doing so. There has been no swelling. It does feel somewhat stiff and somewhat unstable. She also does hear some crackling in the knee. It is more painful if she does a deep knee bend. Is able to get into child's pose in yoga if she goes slowly. No bulge behind the knee. Pain is at the front of the knee. Topical gator ointment has been helpful. Needs to use a pillow between her knees at night due to discomfort. When she is not busy, she does notice some discomfort in the knee at rest.     Last Tuesday developed what felt like a cold. Did a covid test on Friday and it was negative. Has had low grade temps. Always has low temp when she is feeling well and her temp has been closer to 99. Mild symptoms thusfar. Alternates with her sisters caring for her mom who is in hospice. Has been staying home due to symptoms.        Review of Systems   As above       Objective           Vitals:  No vitals were obtained today due to virtual visit.    Physical Exam     GENERAL: Healthy, alert and no distress  EYES: Eyes grossly normal to inspection.  No discharge or erythema, or obvious scleral/conjunctival abnormalities.  RESP: No audible wheeze, cough, or visible cyanosis.  No visible retractions or increased work of breathing.    SKIN: Visible skin clear. No significant rash, abnormal pigmentation or lesions.  NEURO: Cranial nerves grossly intact.  Mentation and speech appropriate for age.  PSYCH: Mentation appears normal, affect normal/bright, judgement and insight intact, normal speech and appearance well-groomed.               Assessment & Plan     Right knee pain, unspecified chronicity  unclear etiology with  uncertain prognosis, requires further workup   She will schedule with sports medicine and PT.   - Orthopedic & Spine  Referral  - [facility]    Genital herpes simplex, unspecified site  Stable on suppressive therapy. Due for BMP. Will schedule when she is feeling better.   - valACYclovir (VALTREX) 500 MG tablet  Dispense: 180 tablet; Refill: 3  - **Basic metabolic panel FUTURE anytime    Psoriasis   normally sees dermatology. Requested refill of ketoconazole shampoo.   - ketoconazole (NIZORAL) 2 % external shampoo  Dispense: 120 mL; Refill: 1    URI  Symptoms of viral URI, with one negative COVID test completed at day 4 of illness. Discussed this could be false negative and continue to treat as possible COVID infection. Mild symptoms at this point. If worsening or not improving she will let me know. See instructions below.         Patient Instructions   1) Schedule with ortho and physical therapy for your right knee pain (wait until you are feeling better before you go in for a visit).   2) Let me know in the next few days if you are not feeling better and would like to repeat a COVID test.   3) I refilled your valtrex and ketoconazole shampoo. Schedule a lab visit when you are feeling better to have a metabolic panel drawn for medication monitoring.     Discharge Instructions for COVID-19 Patients  You have--or may have--COVID-19. Please follow the instructions listed below.   If you have a weakened immune system, discuss with your doctor any other actions you need to take.  How can I protect others?  If you have symptoms (fever, cough, body aches or trouble breathing):    Stay home and away from others (self-isolate) until:  ? At least 10 days have passed since your symptoms started, And   ? You've had no fever--and no medicine that reduces fever--for 1 full day (24 hours), And    ? Your other symptoms have resolved (gotten better).  If you don't show symptoms, but testing  "showed that you have COVID-19:    Stay home and away from others (self-isolate). Follow the tips under \"How do I self-isolate?\" below for 10 days (20 days if you have a weak immune system).    You don't need to be retested for COVID-19 before going back to school or work. As long as you're fever-free and feeling better, you can go back to school, work and other activities after waiting the 10 or 20 days.   How do I self-isolate?    Stay in your own room, even for meals. Use your own bathroom if you can.    Stay away from others in your home. No hugging, kissing or shaking hands. No visitors.    Don't go to work, school or anywhere else.    Clean \"high touch\" surfaces often (doorknobs, counters, handles). Use household cleaning spray or wipes. You'll find a full list of  on the EPA website: www.epa.gov/pesticide-registration/list-n-disinfectants-use-against-sars-cov-2.    Cover your mouth and nose with a mask or other face covering to avoid spreading germs.    Wash your hands and face often. Use soap and water.    Caregivers in these groups are at risk for severe illness due to COVID-19:  ? People 65 years and older  ? People who live in a nursing home or long-term care facility  ? People with chronic disease (lung, heart, cancer, diabetes, kidney, liver, immunologic)  ? People who have a weakened immune system, including those who:    Are in cancer treatment    Take medicine that weakens the immune system, such as corticosteroids    Had a bone marrow or organ transplant    Have an immune deficiency    Have poorly controlled HIV or AIDS    Are obese (body mass index of 40 or higher)    Smoke regularly    Caregivers should wear gloves while washing dishes, handling laundry and cleaning bedrooms and bathrooms.    Use caution when washing and drying laundry: Don't shake dirty laundry and use the warmest water setting that you can.    For more tips on managing your health at home, go to " www.cdc.gov/coronavirus/2019-ncov/downloads/10Things.pdf.  How can I take care of myself at home?  1. Get lots of rest. Drink extra fluids (unless a doctor has told you not to).    2. Take Tylenol (acetaminophen) for fever or pain. If you have liver or kidney problems, ask your family doctor if it's okay to take Tylenol.     Adults can take either:  ? 650 mg (two 325 mg pills) every 4 to 6 hours, or   ? 1,000 mg (two 500 mg pills) every 8 hours as needed.  ? Note: Don't take more than 3,000 mg in one day. Acetaminophen is found in many medicines (both prescribed and over-the-counter medicines). Read all labels to be sure you don't take too much.   For children, check the Tylenol bottle for the right dose. The dose is based on the child's age or weight.  3. If you have other health problems (like cancer, heart failure, an organ transplant or severe kidney disease): Call your specialty clinic if you don't feel better in the next 2 days.    4. Know when to call 911. Emergency warning signs include:  ? Trouble breathing or shortness of breath  ? Pain or pressure in the chest that doesn't go away  ? Feeling confused like you haven't felt before, or not being able to wake up  ? Bluish-colored lips or face    5. Your doctor may have prescribed a blood thinner medicine. Follow their instructions.  Where can I get more information?    [facility] - About COVID-19: [email or web address]    CDC - What to Do If You're Sick: www.cdc.gov/coronavirus/2019-ncov/about/steps-when-sick.html    CDC - Ending Home Isolation: www.cdc.gov/coronavirus/2019-ncov/hcp/disposition-in-home-patients.html    CDC - Caring for Someone: www.cdc.gov/coronavirus/2019-ncov/if-you-are-sick/care-for-someone.html    [facility] - Interim Guidance for Hospital Discharge to Home: www.health[facility]/diseases/coronavirus/hcp/hospdischarge.pdf    [facility] clinical trials (COVID-19 research studies):  clinicalaffairs.[facility]-clinical-trials    Below are the COVID-19 hotlines at the [place] [facility] ([facility]). Interpreters are available.  ? For health questions: Call 200-771-3030 or 1-518.488.2433 (7 a.m. to 7 p.m.)  ? For questions about schools and childcare: Call 260-415-9810 or 1-979.487.4806 (7 a.m. to 7 p.m.)    For informational purposes only. Not to replace the advice of your health care provider. Clinically reviewed by the Infection Prevention Team. Copyright   2020 [facility]. All rights reserved. [employer] 480609 - REV 08/04/20.        No follow-ups on file.    Kait Moss MD   [facility]      Video-Visit Details    Type of service:  Video Visit    Video End Time:4:48 PM    Originating Location (pt. Location): Home    Distant Location (provider location):  [facility]     Platform used for Video Visit: Kimberley

## 2020-10-30 NOTE — PROGRESS NOTES
[facility] Initial Evaluation    Subjective:  Arlene Beck is a 65 year old female with a right knee condition. Location of symptoms: anterior, medial knee, right buttock. Associated symptoms: stiffness, giving way, weakness.  Symptoms are exacerbated by: walking, playing tennis, getting down on the floor for yoga - child's pose, kneeling, squatting, cleaning/scrubbing floors. Symptoms are relieved by: activity avoidance and ibuprofen.  Special tests (x-ray, MRI, CT scan, EMG, bone scan): x-ray. Date on order: October 2020    Answers for HPI/ROS submitted by the patient on 11/2/2020   History Reported by Patient  Reason for Visit:: Knee pain (right knee)  How problem occurred:: I don't remember any particular injury or date. It has just been bothering me for some time now. But I can no longer do some of the usual exercises that I used to do easily. Particularly deep knee bends. I have stopped them altogether.  Number scale: 3/10  General health as reported by patient: good  Please check all that apply to your current or past medical history: other  Other Med Hx Detail: My right knee started aching sometime in the spring. I have definitely altered my exercise patterns and I'm starting to feel the overall lack of exercise which is not helpful for me.  Medical allergies: other  Other Allergies Detail: My other allergies are in my chart related to drugs  Surgeries: other  Other Surgery Detail: My other surgeries are available in my chart  Medications you are currently taking: anti-inflammatory, other  Other Meds Detail: I will sometimes take an ibuprofen and my other medications are in my file  Occupation::   What are your primary job tasks: computer work, prolonged sitting, other  Other Tasks Detail: regular housework, cleaning, shopping, cooking, etc.      Objective  Gait:  Decreased right knee terminal knee extension    Knee AROM (* = pain) Right Left   FL Min loss* WNL   EXT  Min loss* WNL   Hyperextension       Knee Strength (* = pain) Right Left   FL 5-/5 5/5   EXT 5/5 5/5   Quad Contraction (Good/Fair/Poor) good good   Hip ABD NT/5 NT/5     Palpation Tenderness:  Right medial distal quad      Lumbar AROM: FL = WNL and no pain, EXT = min loss and pain  +slump on right    Assessment/Plan:    Patient is a 65 year old female with right side knee complaints.    Patient has the following significant findings with corresponding treatment plan.                Diagnosis 1:  Right knee pain  Pain -  manual therapy, self management, education, directional preference exercise and home program  Decreased ROM/flexibility - manual therapy and therapeutic exercise  Decreased strength - therapeutic exercise and therapeutic activities  Impaired balance - neuro re-education and therapeutic activities  Decreased proprioception - neuro re-education and therapeutic activities  Impaired gait - gait training  Impaired muscle performance - neuro re-education  Decreased function - therapeutic activities  Impaired posture - neuro re-education    Previous and current functional limitations:  (See Goal Flow Sheet for this information)    Short term and Long term goals: (See Goal Flow Sheet for this information)     Communication ability:  Patient appears to be able to clearly communicate and understand verbal and written communication and follow directions correctly.  Treatment Explanation - The following has been discussed with the patient:   RX ordered/plan of care  Anticipated outcomes  Possible risks and side effects  This patient would benefit from PT intervention to resume normal activities.   Rehab potential is good.    Frequency:  1 X week, once daily  Duration:  for 4 weeks tapering to 2 X a month over 1 month  Discharge Plan:  Achieve all LTG.  Independent in home treatment program.  Reach maximal therapeutic benefit.    Please refer to the daily flowsheet for treatment today, total treatment time and  time spent performing 1:1 timed codes.

## 2020-11-02 ENCOUNTER — ANCILLARY PROCEDURE (OUTPATIENT)
Dept: GENERAL RADIOLOGY | Facility: CLINIC | Age: 65
End: 2020-11-02
Attending: FAMILY MEDICINE
Payer: COMMERCIAL

## 2020-11-02 ENCOUNTER — OFFICE VISIT (OUTPATIENT)
Dept: ORTHOPEDICS | Facility: CLINIC | Age: 65
End: 2020-11-02
Payer: COMMERCIAL

## 2020-11-02 ENCOUNTER — THERAPY VISIT (OUTPATIENT)
Dept: PHYSICAL THERAPY | Facility: CLINIC | Age: 65
End: 2020-11-02
Attending: FAMILY MEDICINE
Payer: COMMERCIAL

## 2020-11-02 VITALS
BODY MASS INDEX: 19.49 KG/M2 | HEIGHT: 63 IN | WEIGHT: 110 LBS | DIASTOLIC BLOOD PRESSURE: 72 MMHG | SYSTOLIC BLOOD PRESSURE: 121 MMHG

## 2020-11-02 DIAGNOSIS — M23.203 DEGENERATIVE TEAR OF MEDIAL MENISCUS OF RIGHT KNEE: Primary | ICD-10-CM

## 2020-11-02 DIAGNOSIS — M25.561 RIGHT KNEE PAIN, UNSPECIFIED CHRONICITY: ICD-10-CM

## 2020-11-02 DIAGNOSIS — M25.561 CHRONIC PAIN OF RIGHT KNEE: ICD-10-CM

## 2020-11-02 DIAGNOSIS — G89.29 CHRONIC PAIN OF RIGHT KNEE: ICD-10-CM

## 2020-11-02 PROCEDURE — 97110 THERAPEUTIC EXERCISES: CPT | Mod: GP | Performed by: PHYSICAL THERAPIST

## 2020-11-02 PROCEDURE — 97161 PT EVAL LOW COMPLEX 20 MIN: CPT | Mod: GP | Performed by: PHYSICAL THERAPIST

## 2020-11-02 PROCEDURE — 73562 X-RAY EXAM OF KNEE 3: CPT | Performed by: FAMILY MEDICINE

## 2020-11-02 PROCEDURE — 99214 OFFICE O/P EST MOD 30 MIN: CPT | Performed by: FAMILY MEDICINE

## 2020-11-02 PROCEDURE — 97530 THERAPEUTIC ACTIVITIES: CPT | Mod: GP | Performed by: PHYSICAL THERAPIST

## 2020-11-02 ASSESSMENT — MIFFLIN-ST. JEOR: SCORE: 1023.09

## 2020-11-02 NOTE — PROGRESS NOTES
[facility]   Clinic Visit s Nov 2, 2020    PCP: Kait Moss    ASSESSMENT/PLAN    ICD-10-CM    1. Degenerative tear of medial meniscus of right knee  M23.203 [facility], HAND, AND CHIROPRACTIC REFERRAL   2. Right knee pain, unspecified chronicity  M25.561 Orthopedic & Spine  Referral     XR Knee Standing AP Bilat [place] Bilat Lat Right     Given essentially normal radiographs, with mechanism involving deep knee flexion, degenerative meniscus tear suspected, differential diagnosis includes mild chondromalacia.  Options include a diagnostic/therapeutic trial of a cortisone injection versus physical therapy.  She is eager to proceed with the therapy first and declines injection today.  Reevaluate as needed.         Today's Visit:  Arlene is a 65 year old female who is seen in consultation at the request of Dr. Moss for   Chief Complaint   Patient presents with     Right Knee - Pain       Injury: Reports insidious onset without acute precipitating event.       Location of Pain: right knee anterior , nonradiating   Duration of Pain: acute on chronic, 5 month(s),   Rating of Pain at worst: 4/10  Rating of Pain Currently: 2/10  Pain is better with: activity avoidance and ibuprofen   Pain is worse with: brisk walks, tennis, getting down on the floor for yoga - child's pose  Treatment so far consists of: no treatment tried to date  Associated symptoms: no distal numbness or tingling; denies swelling or warmth  Recent imaging completed: X-rays completed 11/2/20.  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 , computer work      Past Medical History:   Diagnosis Date     COPD (chronic obstructive pulmonary disease) (H)     I'm not sure if what I get rises to this condition     Fatigue 7/3/2015     Genital herpes, unspecified      Irritable bowel syndrome      Uncomplicated asthma     I have had ALLERGIC reaction  asthma       Patient Active Problem List    Diagnosis Date Noted     Raynaud's disease without gangrene 04/25/2017     Priority: Medium     Well woman exam (no gynecological exam) 07/28/2016     Priority: Medium     Fatigue 07/03/2015     Priority: Medium     Advanced directives, counseling/discussion 07/24/2012     Priority: Medium     CARDIOVASCULAR SCREENING; LDL GOAL LESS THAN 160 05/09/2010     Priority: Medium     Genital herpes 05/16/2005     Priority: Medium     Problem list name updated by automated process. Provider to review       Irritable bowel syndrome 05/16/2005     Priority: Medium       Family History   Problem Relation Age of Onset     Eye Disorder Mother         glaucoma     Allergies Mother      Breast Cancer Mother         double mastectomy     Coronary Artery Disease Mother         She had a stint put into her heart in March 2019     Other Cancer Mother         Breast cancer     Thyroid Disease Mother         Low functioning thyroid     C.A.D. Father      Cancer Father         prostate     Allergies Father      Heart Disease Father      Diabetes Father      Hypertension Father      Cerebrovascular Disease Father      Prostate Cancer Father      Asthma Maternal Grandmother      Osteoporosis Maternal Grandmother      Thyroid Disease Maternal Grandmother      Hypertension Sister      Gastrointestinal Disease Sister         hyperplasia     Lipids Brother         tachycardia, knee and joint problems       Social History     Socioeconomic History     Marital status: Single     Spouse name: None     Number of children: 0     Years of education: None     Highest education level: None   Occupational History     Occupation:      Employer: [employer]     Employer: [employer]   Social Needs     Financial resource strain: None     Food insecurity     Worry: None     Inability: None     Transportation needs     Medical: None     Non-medical: None  "  Tobacco Use     Smoking status: Never Smoker     Smokeless tobacco: Never Used   Substance and Sexual Activity     Alcohol use: Yes     Comment: I have reduced all sugar consumption since January 2017     Drug use: No       Past Surgical History:   Procedure Laterality Date     C APPENDECTOMY  1967     HC EXCISION OF LINGUAL TONSIL  1963     LAPAROSCOPY PROCEDURE UNLISTED  1975    2 times, for pain      TUBAL LIGATION  2002           Review of Systems   Musculoskeletal: Positive for joint pain.   All other systems reviewed and are negative.        Physical Exam  Musculoskeletal:      Right knee: She exhibits no effusion.         /72   Ht 1.6 m (5' 3\")   Wt 49.9 kg (110 lb)   LMP 04/20/2015   BMI 19.49 kg/m    Constitutional:well-developed, well-nourished, and in no distress.   Cardiovascular: Intact distal pulses.    Neurological: alert. Gait Normal:   Gait, station, stance, and balance appear normal for age  Skin: Skin is warm and dry.   Psychiatric: Mood and affect normal.   Respiratory: unlabored, speaks in full sentences  Lymph: no LAD, no lymphangitis      Right Knee Exam     Tenderness   The patient is experiencing tenderness in the medial joint line.    Range of Motion   Extension: normal   Flexion: abnormal Right knee flexion: Pain at end range of full flexion.     Tests   Ramesh:  Medial - negative Lateral - negative  Varus: negative Valgus: negative  Drawer:  Anterior - negative    Posterior - negative  Patellar apprehension: negative    Other   Erythema: absent  Scars: absent  Sensation: normal  Pulse: present  Swelling: none  Effusion: no effusion present            X-ray images Ordered and independently reviewed by me in the office today with the patient. X-ray shows:   Recent Results (from the past 48 hour(s))   XR Knee Standing AP Bilat [place] Bilat Lat Right    Narrative    11/2/2020    No fracture, dislocation and or lesion. Normal alignment.  Joint space   maintained no significant " arthritis. No appreciable soft tissue   abnormality

## 2020-11-02 NOTE — LETTER
11/2/2020         RE: Arlene Beck  [address]  [place] [address]        Dear Colleague,    Thank you for referring your patient, Arlene Beck, to the [facility]. Please see a copy of my visit note below.    HPI     [facility]   Clinic Visit s Nov 2, 2020    PCP: Kait Moss    ASSESSMENT/PLAN    ICD-10-CM    1. Degenerative tear of medial meniscus of right knee  M23.203 [facility], AND CHIROPRACTIC REFERRAL   2. Right knee pain, unspecified chronicity  M25.561 Orthopedic & Spine  Referral     XR Knee Standing AP Bilat [place] Bilat Lat Right     Given essentially normal radiographs, with mechanism involving deep knee flexion, degenerative meniscus tear suspected, differential diagnosis includes mild chondromalacia.  Options include a diagnostic/therapeutic trial of a cortisone injection versus physical therapy.  She is eager to proceed with the therapy first and declines injection today.  Reevaluate as needed.         Today's Visit:  Arlene is a 65 year old female who is seen in consultation at the request of Dr. Moss for   Chief Complaint   Patient presents with     Right Knee - Pain       Injury: Reports insidious onset without acute precipitating event.       Location of Pain: right knee anterior , nonradiating   Duration of Pain: acute on chronic, 5 month(s),   Rating of Pain at worst: 4/10  Rating of Pain Currently: 2/10  Pain is better with: activity avoidance and ibuprofen   Pain is worse with: brisk walks, tennis, getting down on the floor for yoga - child's pose  Treatment so far consists of: no treatment tried to date  Associated symptoms: no distal numbness or tingling; denies swelling or warmth  Recent imaging completed: X-rays completed 11/2/20.  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 , computer work      Past Medical History:    Diagnosis Date     COPD (chronic obstructive pulmonary disease) (H)     I'm not sure if what I get rises to this condition     Fatigue 7/3/2015     Genital herpes, unspecified      Irritable bowel syndrome      Uncomplicated asthma     I have had ALLERGIC reaction asthma       Patient Active Problem List    Diagnosis Date Noted     Raynaud's disease without gangrene 04/25/2017     Priority: Medium     Well woman exam (no gynecological exam) 07/28/2016     Priority: Medium     Fatigue 07/03/2015     Priority: Medium     Advanced directives, counseling/discussion 07/24/2012     Priority: Medium     CARDIOVASCULAR SCREENING; LDL GOAL LESS THAN 160 05/09/2010     Priority: Medium     Genital herpes 05/16/2005     Priority: Medium     Problem list name updated by automated process. Provider to review       Irritable bowel syndrome 05/16/2005     Priority: Medium       Family History   Problem Relation Age of Onset     Eye Disorder Mother         glaucoma     Allergies Mother      Breast Cancer Mother         double mastectomy     Coronary Artery Disease Mother         She had a stint put into her heart in March 2019     Other Cancer Mother         Breast cancer     Thyroid Disease Mother         Low functioning thyroid     C.A.D. Father      Cancer Father         prostate     Allergies Father      Heart Disease Father      Diabetes Father      Hypertension Father      Cerebrovascular Disease Father      Prostate Cancer Father      Asthma Maternal Grandmother      Osteoporosis Maternal Grandmother      Thyroid Disease Maternal Grandmother      Hypertension Sister      Gastrointestinal Disease Sister         hyperplasia     Lipids Brother         tachycardia, knee and joint problems       Social History     Socioeconomic History     Marital status: Single     Spouse name: None     Number of children: 0     Years of education: None     Highest education level: None   Occupational History     Occupation:       "Employer: [employer]     Employer: [employer]   Social Needs     Financial resource strain: None     Food insecurity     Worry: None     Inability: None     Transportation needs     Medical: None     Non-medical: None   Tobacco Use     Smoking status: Never Smoker     Smokeless tobacco: Never Used   Substance and Sexual Activity     Alcohol use: Yes     Comment: I have reduced all sugar consumption since January 2017     Drug use: No       Past Surgical History:   Procedure Laterality Date     C APPENDECTOMY  1967     HC EXCISION OF LINGUAL TONSIL  1963     LAPAROSCOPY PROCEDURE UNLISTED  1975    2 times, for pain      TUBAL LIGATION  2002           Review of Systems   Musculoskeletal: Positive for joint pain.   All other systems reviewed and are negative.        Physical Exam  Musculoskeletal:      Right knee: She exhibits no effusion.         /72   Ht 1.6 m (5' 3\")   Wt 49.9 kg (110 lb)   LMP 04/20/2015   BMI 19.49 kg/m    Constitutional:well-developed, well-nourished, and in no distress.   Cardiovascular: Intact distal pulses.    Neurological: alert. Gait Normal:   Gait, station, stance, and balance appear normal for age  Skin: Skin is warm and dry.   Psychiatric: Mood and affect normal.   Respiratory: unlabored, speaks in full sentences  Lymph: no LAD, no lymphangitis      Right Knee Exam     Tenderness   The patient is experiencing tenderness in the medial joint line.    Range of Motion   Extension: normal   Flexion: abnormal Right knee flexion: Pain at end range of full flexion.     Tests   Ramesh:  Medial - negative Lateral - negative  Varus: negative Valgus: negative  Drawer:  Anterior - negative    Posterior - negative  Patellar apprehension: negative    Other   Erythema: absent  Scars: absent  Sensation: normal  Pulse: present  Swelling: none  Effusion: no effusion present            X-ray images Ordered and independently reviewed by me in the office " today with the patient. X-ray shows:   Recent Results (from the past 48 hour(s))   XR Knee Standing AP Bilat [place] Bilat Lat Right    Narrative    11/2/2020    No fracture, dislocation and or lesion. Normal alignment.  Joint space   maintained no significant arthritis. No appreciable soft tissue   abnormality             Again, thank you for allowing me to participate in the care of your patient.        Sincerely,        Srikanth Miller MD

## 2020-11-09 ENCOUNTER — THERAPY VISIT (OUTPATIENT)
Dept: PHYSICAL THERAPY | Facility: CLINIC | Age: 65
End: 2020-11-09
Payer: COMMERCIAL

## 2020-11-09 DIAGNOSIS — M25.561 CHRONIC PAIN OF RIGHT KNEE: ICD-10-CM

## 2020-11-09 DIAGNOSIS — G89.29 CHRONIC PAIN OF RIGHT KNEE: ICD-10-CM

## 2020-11-09 PROCEDURE — 97110 THERAPEUTIC EXERCISES: CPT | Mod: GP | Performed by: PHYSICAL THERAPIST

## 2020-11-14 ENCOUNTER — HEALTH MAINTENANCE LETTER (OUTPATIENT)
Age: 65
End: 2020-11-14

## 2020-11-16 ENCOUNTER — THERAPY VISIT (OUTPATIENT)
Dept: PHYSICAL THERAPY | Facility: CLINIC | Age: 65
End: 2020-11-16
Payer: COMMERCIAL

## 2020-11-16 DIAGNOSIS — M25.561 CHRONIC PAIN OF RIGHT KNEE: ICD-10-CM

## 2020-11-16 DIAGNOSIS — G89.29 CHRONIC PAIN OF RIGHT KNEE: ICD-10-CM

## 2020-11-16 PROCEDURE — 97110 THERAPEUTIC EXERCISES: CPT | Mod: GP | Performed by: PHYSICAL THERAPIST

## 2020-11-16 ASSESSMENT — ACTIVITIES OF DAILY LIVING (ADL)
STIFFNESS: I HAVE THE SYMPTOM BUT IT DOES NOT AFFECT MY ACTIVITY
STAND: ACTIVITY IS NOT DIFFICULT
HOW_WOULD_YOU_RATE_THE_CURRENT_FUNCTION_OF_YOUR_KNEE_DURING_YOUR_USUAL_DAILY_ACTIVITIES_ON_A_SCALE_FROM_0_TO_100_WITH_100_BEING_YOUR_LEVEL_OF_KNEE_FUNCTION_PRIOR_TO_YOUR_INJURY_AND_0_BEING_THE_INABILITY_TO_PERFORM_ANY_OF_YOUR_USUAL_DAILY_ACTIVITIES?: 95
SIT WITH YOUR KNEE BENT: ACTIVITY IS NOT DIFFICULT
WEAKNESS: I DO NOT HAVE THE SYMPTOM
HOW_WOULD_YOU_RATE_THE_OVERALL_FUNCTION_OF_YOUR_KNEE_DURING_YOUR_USUAL_DAILY_ACTIVITIES?: NEARLY NORMAL
AS_A_RESULT_OF_YOUR_KNEE_INJURY,_HOW_WOULD_YOU_RATE_YOUR_CURRENT_LEVEL_OF_DAILY_ACTIVITY?: NEARLY NORMAL
KNEE_ACTIVITY_OF_DAILY_LIVING_SCORE: 85.71
KNEE_ACTIVITY_OF_DAILY_LIVING_SUM: 60
PAIN: THE SYMPTOM AFFECTS MY ACTIVITY SLIGHTLY
GO UP STAIRS: ACTIVITY IS NOT DIFFICULT
SWELLING: I DO NOT HAVE THE SYMPTOM
LIMPING: I DO NOT HAVE THE SYMPTOM
WALK: ACTIVITY IS MINIMALLY DIFFICULT
KNEEL ON THE FRONT OF YOUR KNEE: ACTIVITY IS MINIMALLY DIFFICULT
SQUAT: ACTIVITY IS VERY DIFFICULT
RAW_SCORE: 60
GO DOWN STAIRS: ACTIVITY IS NOT DIFFICULT
GIVING WAY, BUCKLING OR SHIFTING OF KNEE: I DO NOT HAVE THE SYMPTOM
RISE FROM A CHAIR: ACTIVITY IS MINIMALLY DIFFICULT

## 2020-12-07 ENCOUNTER — THERAPY VISIT (OUTPATIENT)
Dept: PHYSICAL THERAPY | Facility: CLINIC | Age: 65
End: 2020-12-07
Payer: COMMERCIAL

## 2020-12-07 DIAGNOSIS — M25.561 CHRONIC PAIN OF RIGHT KNEE: ICD-10-CM

## 2020-12-07 DIAGNOSIS — G89.29 CHRONIC PAIN OF RIGHT KNEE: ICD-10-CM

## 2020-12-07 PROCEDURE — 97110 THERAPEUTIC EXERCISES: CPT | Mod: GP | Performed by: PHYSICAL THERAPIST

## 2020-12-07 ASSESSMENT — ACTIVITIES OF DAILY LIVING (ADL)
HOW_WOULD_YOU_RATE_THE_CURRENT_FUNCTION_OF_YOUR_KNEE_DURING_YOUR_USUAL_DAILY_ACTIVITIES_ON_A_SCALE_FROM_0_TO_100_WITH_100_BEING_YOUR_LEVEL_OF_KNEE_FUNCTION_PRIOR_TO_YOUR_INJURY_AND_0_BEING_THE_INABILITY_TO_PERFORM_ANY_OF_YOUR_USUAL_DAILY_ACTIVITIES?: 90
AS_A_RESULT_OF_YOUR_KNEE_INJURY,_HOW_WOULD_YOU_RATE_YOUR_CURRENT_LEVEL_OF_DAILY_ACTIVITY?: NEARLY NORMAL
WALK: ACTIVITY IS NOT DIFFICULT
RAW_SCORE: 65
STAND: ACTIVITY IS NOT DIFFICULT
SIT WITH YOUR KNEE BENT: ACTIVITY IS NOT DIFFICULT
WEAKNESS: I DO NOT HAVE THE SYMPTOM
PAIN: I HAVE THE SYMPTOM BUT IT DOES NOT AFFECT MY ACTIVITY
KNEE_ACTIVITY_OF_DAILY_LIVING_SUM: 65
HOW_WOULD_YOU_RATE_THE_OVERALL_FUNCTION_OF_YOUR_KNEE_DURING_YOUR_USUAL_DAILY_ACTIVITIES?: NEARLY NORMAL
SWELLING: I DO NOT HAVE THE SYMPTOM
LIMPING: I DO NOT HAVE THE SYMPTOM
STIFFNESS: I HAVE THE SYMPTOM BUT IT DOES NOT AFFECT MY ACTIVITY
SQUAT: ACTIVITY IS SOMEWHAT DIFFICULT
GO UP STAIRS: ACTIVITY IS NOT DIFFICULT
GO DOWN STAIRS: ACTIVITY IS NOT DIFFICULT
KNEE_ACTIVITY_OF_DAILY_LIVING_SCORE: 92.86
GIVING WAY, BUCKLING OR SHIFTING OF KNEE: I DO NOT HAVE THE SYMPTOM
RISE FROM A CHAIR: ACTIVITY IS NOT DIFFICULT
KNEEL ON THE FRONT OF YOUR KNEE: ACTIVITY IS MINIMALLY DIFFICULT

## 2020-12-07 NOTE — PROGRESS NOTES
DISCHARGE REPORT    Progress reporting period is from 11/2/20 to 12/7/20.       SUBJECTIVE  Subjective changes noted by patient:  Patient reports improving pain symptoms, ROM, strength and overall function. Patient reports exercises are going well at home.    Current Pain level: 0/10.     Initial Pain level: 4/10.   Changes in function:  Yes (See Goal flowsheet attached for changes in current functional level)  Adverse reaction to treatment or activity: None    OBJECTIVE  Changes noted in objective findings:  Yes,   Objective: right knee AROM and strength WNL     ASSESSMENT/PLAN  Updated problem list and treatment plan: Diagnosis 1:  right knee pain - possible lumbar derangement    STG/LTGs have been met or progress has been made towards goals:  Yes (See Goal flow sheet completed today.)  Assessment of Progress: The patient's condition is improving.  Self Management Plans:  Patient is independent in a home treatment program.  Patient is independent in self management of symptoms.    Arlene continues to require the following intervention to meet STG and LTG's:  PT intervention is no longer required to meet STG/LTG.    Recommendations:  This patient is ready to be discharged from therapy and continue their home treatment program.    Please refer to the daily flowsheet for treatment today, total treatment time and time spent performing 1:1 timed codes.

## 2021-03-08 ENCOUNTER — MYC MEDICAL ADVICE (OUTPATIENT)
Dept: FAMILY MEDICINE | Facility: CLINIC | Age: 66
End: 2021-03-08

## 2021-09-03 ENCOUNTER — HOSPITAL ENCOUNTER (OUTPATIENT)
Dept: MAMMOGRAPHY | Facility: CLINIC | Age: 66
Discharge: HOME | End: 2021-09-03
Attending: FAMILY MEDICINE | Admitting: FAMILY MEDICINE
Payer: COMMERCIAL

## 2021-09-03 DIAGNOSIS — Z12.31 OTHER SCREENING MAMMOGRAM: ICD-10-CM

## 2021-09-03 PROCEDURE — 77063 BREAST TOMOSYNTHESIS BI: CPT

## 2021-09-12 ENCOUNTER — HEALTH MAINTENANCE LETTER (OUTPATIENT)
Age: 66
End: 2021-09-12

## 2021-10-04 ENCOUNTER — OFFICE VISIT (OUTPATIENT)
Dept: FAMILY MEDICINE | Facility: CLINIC | Age: 66
End: 2021-10-04
Payer: COMMERCIAL

## 2021-10-04 VITALS
TEMPERATURE: 97.4 F | HEIGHT: 63 IN | DIASTOLIC BLOOD PRESSURE: 62 MMHG | WEIGHT: 111.08 LBS | SYSTOLIC BLOOD PRESSURE: 120 MMHG | BODY MASS INDEX: 19.68 KG/M2 | OXYGEN SATURATION: 100 % | HEART RATE: 59 BPM

## 2021-10-04 DIAGNOSIS — Z13.220 LIPID SCREENING: ICD-10-CM

## 2021-10-04 DIAGNOSIS — L40.9 PSORIASIS: ICD-10-CM

## 2021-10-04 DIAGNOSIS — Z12.11 SCREENING FOR COLON CANCER: ICD-10-CM

## 2021-10-04 DIAGNOSIS — Z23 NEED FOR PROPHYLACTIC VACCINATION AND INOCULATION AGAINST INFLUENZA: ICD-10-CM

## 2021-10-04 DIAGNOSIS — Z00.00 ROUTINE GENERAL MEDICAL EXAMINATION AT A HEALTH CARE FACILITY: Primary | ICD-10-CM

## 2021-10-04 DIAGNOSIS — A60.00 GENITAL HERPES SIMPLEX, UNSPECIFIED SITE: ICD-10-CM

## 2021-10-04 PROCEDURE — 80061 LIPID PANEL: CPT | Performed by: FAMILY MEDICINE

## 2021-10-04 PROCEDURE — 99396 PREV VISIT EST AGE 40-64: CPT | Mod: 25 | Performed by: FAMILY MEDICINE

## 2021-10-04 PROCEDURE — 36415 COLL VENOUS BLD VENIPUNCTURE: CPT | Performed by: FAMILY MEDICINE

## 2021-10-04 PROCEDURE — 80048 BASIC METABOLIC PNL TOTAL CA: CPT | Performed by: FAMILY MEDICINE

## 2021-10-04 PROCEDURE — 90682 RIV4 VACC RECOMBINANT DNA IM: CPT | Performed by: FAMILY MEDICINE

## 2021-10-04 PROCEDURE — 90471 IMMUNIZATION ADMIN: CPT | Performed by: FAMILY MEDICINE

## 2021-10-04 RX ORDER — KETOCONAZOLE 20 MG/ML
SHAMPOO TOPICAL DAILY PRN
Qty: 120 ML | Refills: 1 | Status: SHIPPED | OUTPATIENT
Start: 2021-10-04 | End: 2022-12-12

## 2021-10-04 RX ORDER — VALACYCLOVIR HYDROCHLORIDE 500 MG/1
500 TABLET, FILM COATED ORAL 2 TIMES DAILY
Qty: 180 TABLET | Refills: 3 | Status: SHIPPED | OUTPATIENT
Start: 2021-10-04 | End: 2022-12-12

## 2021-10-04 ASSESSMENT — ENCOUNTER SYMPTOMS
DIARRHEA: 0
ABDOMINAL PAIN: 0
BREAST MASS: 0
FREQUENCY: 0
PARESTHESIAS: 0
SHORTNESS OF BREATH: 0
CONSTIPATION: 1
NERVOUS/ANXIOUS: 1
HEADACHES: 1
HEMATURIA: 0
SORE THROAT: 0
HEMATOCHEZIA: 0
COUGH: 0
HEARTBURN: 0
WEAKNESS: 0
DIZZINESS: 0
NAUSEA: 0
JOINT SWELLING: 1
FEVER: 0
PALPITATIONS: 0
ARTHRALGIAS: 0
CHILLS: 0
EYE PAIN: 0
DYSURIA: 0
MYALGIAS: 0

## 2021-10-04 ASSESSMENT — MIFFLIN-ST. JEOR: SCORE: 1022.99

## 2021-10-04 NOTE — PROGRESS NOTES
SUBJECTIVE:   CC: Arlene Beck is an 66 year old woman who presents for preventive health visit.     Patient has been advised of split billing requirements and indicates understanding: Yes  Healthy Habits:     Getting at least 3 servings of Calcium per day:  Yes    Bi-annual eye exam:  Yes    Dental care twice a year:  Yes    Sleep apnea or symptoms of sleep apnea:  None    Diet:  Gluten-free/reduced    Frequency of exercise:  2-3 days/week    Duration of exercise:  15-30 minutes    Taking medications regularly:  No    Medication side effects:  None    PHQ-2 Total Score: 2    Additional concerns today:  No    Today's PHQ-2 Score:   PHQ-2 ( 1999 [employer]) 10/4/2021   Q1: Little interest or pleasure in doing things 1   Q2: Feeling down, depressed or hopeless 1   PHQ-2 Score 2   Q1: Little interest or pleasure in doing things Several days   Q2: Feeling down, depressed or hopeless Several days   PHQ-2 Score 2       Abuse: Current or Past (Physical, Sexual or Emotional) - No  Do you feel safe in your environment? Yes    Have you ever done Advance Care Planning? (For example, a Health Directive, POLST, or a discussion with a medical provider or your loved ones about your wishes): No, advance care planning information given to patient to review.  Patient plans to discuss their wishes with loved ones or provider.      Social History     Tobacco Use     Smoking status: Never Smoker     Smokeless tobacco: Never Used   Substance Use Topics     Alcohol use: Yes     Comment: I have reduced sugar consumption since January 2017     If you drink alcohol do you typically have >3 drinks per day or >7 drinks per week? No    Alcohol Use 10/4/2021   Prescreen: >3 drinks/day or >7 drinks/week? No   Prescreen: >3 drinks/day or >7 drinks/week? -   No flowsheet data found.    Reviewed orders with patient.  Reviewed health maintenance and updated orders accordingly - Yes       Breast Cancer Screening:    FHS-7:   Breast CA Risk  Assessment (FHS-7) 9/3/2021 10/4/2021   Did any of your first-degree relatives have breast or ovarian cancer? Yes Yes   Did any of your relatives have bilateral breast cancer? No No   Did any man in your family have breast cancer? No No   Did any woman in your family have breast and ovarian cancer? No Yes   Did any woman in your family have breast cancer before age 50 y? No No   Do you have 2 or more relatives with breast and/or ovarian cancer? No No   Do you have 2 or more relatives with breast and/or bowel cancer? No No     click delete button to remove this line now     Pertinent mammograms are reviewed under the imaging tab.    History of abnormal Pap smear:    PAP / HPV Latest Ref Rng & Units 8/9/2018 7/3/2015 7/24/2012   PAP (Historical) - NIL NIL NIL   HPV16 NEG:Negative Negative - -   HPV18 NEG:Negative Negative - -   HRHPV NEG:Negative Negative - -     Reviewed and updated as needed this visit by clinical staff  Tobacco  Allergies  Meds              Reviewed and updated as needed this visit by Provider                Past Medical History:   Diagnosis Date     COPD (chronic obstructive pulmonary disease) (H)     I'm not sure if what I get rises to this condition     Fatigue 7/3/2015     Genital herpes, unspecified      Irritable bowel syndrome      Uncomplicated asthma     I have had ALLERGIC reaction asthma      Past Surgical History:   Procedure Laterality Date     C APPENDECTOMY  1967     HC EXCISION OF LINGUAL TONSIL  1963     LAPAROSCOPY PROCEDURE UNLISTED  1975    2 times, for pain      TUBAL LIGATION  2002       Review of Systems   Constitutional: Negative for chills and fever.   HENT: Negative for congestion, ear pain, hearing loss and sore throat.    Eyes: Positive for visual disturbance. Negative for pain.   Respiratory: Negative for cough and shortness of breath.    Cardiovascular: Negative for chest pain, palpitations and peripheral edema.   Gastrointestinal: Positive for constipation.  "Negative for abdominal pain, diarrhea, heartburn, hematochezia and nausea.   Breasts:  Negative for tenderness, breast mass and discharge.   Genitourinary: Negative for dysuria, frequency, genital sores, hematuria, pelvic pain, urgency, vaginal bleeding and vaginal discharge.   Musculoskeletal: Positive for joint swelling. Negative for arthralgias and myalgias.   Skin: Negative for rash.   Neurological: Positive for headaches. Negative for dizziness, weakness and paresthesias.   Psychiatric/Behavioral: Negative for mood changes. The patient is nervous/anxious.            OBJECTIVE:   /62 (BP Location: Right arm, Patient Position: Sitting, Cuff Size: Adult Regular)   Pulse 59   Temp 97.4  F (36.3  C) (Temporal)   Ht 1.6 m (5' 3\")   Wt 50.4 kg (111 lb 1.3 oz)   LMP 04/20/2015   SpO2 100%   BMI 19.68 kg/m    Physical Exam  GENERAL APPEARANCE: healthy, alert and no distress  EYES: Eyes grossly normal to inspection, PERRL and conjunctivae and sclerae normal  HENT: ear canals and TM's normal, nose and mouth without ulcers or lesions, oropharynx clear and oral mucous membranes moist  NECK: no adenopathy, no asymmetry, masses, or scars and thyroid normal to palpation  RESP: lungs clear to auscultation - no rales, rhonchi or wheezes  CV: regular rate and rhythm, normal S1 S2, no S3 or S4, no murmur, click or rub, no peripheral edema and peripheral pulses strong  ABDOMEN: soft, nontender, no hepatosplenomegaly, no masses and bowel sounds normal  MS: no musculoskeletal defects are noted and gait is age appropriate without ataxia  SKIN: no suspicious lesions or rashes  NEURO: Normal strength and tone, sensory exam grossly normal, mentation intact and speech normal  PSYCH: mentation appears normal and affect normal/bright    Diagnostic Test Results:  Labs reviewed in Epic    ASSESSMENT/PLAN:       ICD-10-CM    1. Routine general medical examination at a health care facility  Z00.00    2. Genital herpes simplex, " "unspecified site  A60.00 Basic metabolic panel  (Ca, Cl, CO2, Creat, Gluc, K, Na, BUN)     valACYclovir (VALTREX) 500 MG tablet     Basic metabolic panel  (Ca, Cl, CO2, Creat, Gluc, K, Na, BUN)   3. Lipid screening  Z13.220 Lipid panel reflex to direct LDL Fasting     Lipid panel reflex to direct LDL Fasting   4. Screening for colon cancer  Z12.11 COLOGUARD(EXACT SCIENCES)   5. Need for prophylactic vaccination and inoculation against influenza  Z23    6. Psoriasis  L40.9 ketoconazole (NIZORAL) 2 % external shampoo      Routine general medical examination at a health care facility  Pap with HPV cotesting  completed in 2018 and was normal.  Had three normal paps since 2012. Never had an abnormal pap test. No further paps are indicated.   She will schedule mammogram 9/2021  She will complete cologuard test for colon cancer screening  - Lipid panel reflex to direct LDL Fasting  Flu shot recommended today   She has received both doses of the Moderna COVID-19 vaccine.        Genital herpes simplex, unspecified site  Refilled valtrex today   - Basic metabolic panel          COUNSELING:  Reviewed preventive health counseling, as reflected in patient instructions    Estimated body mass index is 19.68 kg/m  as calculated from the following:    Height as of this encounter: 1.6 m (5' 3\").    Weight as of this encounter: 50.4 kg (111 lb 1.3 oz).        She reports that she has never smoked. She has never used smokeless tobacco.      Counseling Resources:  ATP IV Guidelines  Pooled Cohorts Equation Calculator  Breast Cancer Risk Calculator  BRCA-Related Cancer Risk Assessment: FHS-7 Tool  FRAX Risk Assessment  ICSI Preventive Guidelines  Dietary Guidelines for Americans, 2010  USDA's MyPlate  ASA Prophylaxis  Lung CA Screening    Kait Moss MD, MD  [facility]  "

## 2021-10-05 LAB
ANION GAP SERPL CALCULATED.3IONS-SCNC: 3 MMOL/L (ref 3–14)
BUN SERPL-MCNC: 8 MG/DL (ref 7–30)
CALCIUM SERPL-MCNC: 9.3 MG/DL (ref 8.5–10.1)
CHLORIDE BLD-SCNC: 103 MMOL/L (ref 94–109)
CHOLEST SERPL-MCNC: 286 MG/DL
CO2 SERPL-SCNC: 28 MMOL/L (ref 20–32)
CREAT SERPL-MCNC: 0.74 MG/DL (ref 0.52–1.04)
FASTING STATUS PATIENT QL REPORTED: NO
GFR SERPL CREATININE-BSD FRML MDRD: 86 ML/MIN/1.73M2
GLUCOSE BLD-MCNC: 82 MG/DL (ref 70–99)
HDLC SERPL-MCNC: 87 MG/DL
LDLC SERPL CALC-MCNC: 186 MG/DL
NONHDLC SERPL-MCNC: 199 MG/DL
POTASSIUM BLD-SCNC: 3.8 MMOL/L (ref 3.4–5.3)
SODIUM SERPL-SCNC: 134 MMOL/L (ref 133–144)
TRIGL SERPL-MCNC: 64 MG/DL

## 2021-10-10 LAB — COLOGUARD-ABSTRACT: NEGATIVE

## 2021-10-12 NOTE — RESULT ENCOUNTER NOTE
The results of your recent lipid (cholesterol) profile were abnormal.    Here are the results:  Lab Results       Component                Value               Date                       CHOL                     286                 10/04/2021                 CHOL                     240                 08/15/2019            Lab Results       Component                Value               Date                       HDL                      87                  10/04/2021                 HDL                      87                  08/15/2019            Lab Results       Component                Value               Date                       LDL                      186                 10/04/2021                 LDL                      133                 08/15/2019            Lab Results       Component                Value               Date                       TRIG                     64                  10/04/2021                 TRIG                     99                  08/15/2019            Lab Results       Component                Value               Date                       CHOLHDLRATIO             3.4                 07/03/2015              Desired or goal levels are:  CHOLESTEROL: Desirable is less than 200.   HDL (Good Cholesterol): Desirable is greater than 40 (for men) greater than 50 (for women).  LDL (Bad Cholesterol): Desirable is less than 130 (or less than 100 if you have heart disease or diabetes). Borderline 130-160.  TRIGLYCERIDES: Desirable is less than 150.  Borderline is 150-200.    To help lower your LDL (bad cholesterol) you could start adding ground flax seed to your diet. The recommended dose is 2 heaping tablespoonfuls daily, you may want to start with 1 tablespoonful and increase to 2 heaping tablespoonfuls. You can mix or add ground flax seed to hot cereals, yogurt, applesauce, cottage cheese or any sauce mixture that is your portion. Ground flax seed can be found in the baking  aisle near the flour.  .        As you may know, an elevated cholesterol is one factor that increases your risk for heart disease and stroke. You can improve your cholesterol by controlling the amount and type of fat you eat and by increasing your daily activity level.    Here are some ways to improve your nutrition:  Eat less fat (especially butter, Crisco and other saturated fats)  Buy lean cuts of meat, reduce your portions of red meat or substitute poultry or fish  Use skim milk and low-fat dairy products  Eat no more than 4 egg yolks per week  Avoid fried or fast foods that are high in fat  Eat more fruits and vegetables      Also consider starting or increasing your aerobic activity. Aerobic activity is the best way to improve HDL (good) cholesterol. If this would be new to you, please talk with me first about what activities are safe for you.      Other lab results :    The testing of your blood sugar, kidney function, and electrolytes was normal.        Please feel free to contact us with any questions or if you would like more information.      Kait Moss M.D.

## 2021-12-23 ENCOUNTER — OFFICE VISIT (OUTPATIENT)
Dept: URGENT CARE | Facility: URGENT CARE | Age: 66
End: 2021-12-23
Payer: COMMERCIAL

## 2021-12-23 VITALS
HEART RATE: 87 BPM | RESPIRATION RATE: 20 BRPM | TEMPERATURE: 97.3 F | DIASTOLIC BLOOD PRESSURE: 72 MMHG | SYSTOLIC BLOOD PRESSURE: 120 MMHG | HEIGHT: 63 IN | OXYGEN SATURATION: 100 % | WEIGHT: 112 LBS | BODY MASS INDEX: 19.84 KG/M2

## 2021-12-23 DIAGNOSIS — S01.81XA FACIAL LACERATION, INITIAL ENCOUNTER: Primary | ICD-10-CM

## 2021-12-23 PROCEDURE — 99213 OFFICE O/P EST LOW 20 MIN: CPT | Performed by: FAMILY MEDICINE

## 2021-12-23 ASSESSMENT — MIFFLIN-ST. JEOR: SCORE: 1027.16

## 2021-12-23 NOTE — PROGRESS NOTES
Subjective: About 14 hours ago she bumped in the end table at home causing a small laceration on her forehead.  She cleaned it up.  Today she worried maybe she should do something more.  There was a lump last night and she used ice and that went away.  Tetanus shot is up-to-date.    Objective: There is a 1-1/2 cm kind of checkmark laceration in the forehead with the skin edges well opposed and no real gap.  I did not open it up.    Assessment and plan: Small laceration, skin edges are well together, too late for stitches anyway but it should heal just fine.  Watch for infection.

## 2022-01-02 ENCOUNTER — HEALTH MAINTENANCE LETTER (OUTPATIENT)
Age: 67
End: 2022-01-02

## 2022-04-18 ENCOUNTER — DOCUMENTATION ONLY (OUTPATIENT)
Dept: OTHER | Facility: CLINIC | Age: 67
End: 2022-04-18
Payer: COMMERCIAL

## 2022-06-15 ENCOUNTER — OFFICE VISIT (OUTPATIENT)
Dept: URGENT CARE | Facility: URGENT CARE | Age: 67
End: 2022-06-15
Payer: COMMERCIAL

## 2022-06-15 VITALS
WEIGHT: 116 LBS | HEIGHT: 63 IN | DIASTOLIC BLOOD PRESSURE: 78 MMHG | SYSTOLIC BLOOD PRESSURE: 118 MMHG | TEMPERATURE: 98.1 F | OXYGEN SATURATION: 99 % | BODY MASS INDEX: 20.55 KG/M2 | RESPIRATION RATE: 18 BRPM | HEART RATE: 72 BPM

## 2022-06-15 DIAGNOSIS — R21 RASH AND NONSPECIFIC SKIN ERUPTION: Primary | ICD-10-CM

## 2022-06-15 PROCEDURE — 36415 COLL VENOUS BLD VENIPUNCTURE: CPT | Performed by: FAMILY MEDICINE

## 2022-06-15 PROCEDURE — 86618 LYME DISEASE ANTIBODY: CPT | Performed by: FAMILY MEDICINE

## 2022-06-15 PROCEDURE — 99214 OFFICE O/P EST MOD 30 MIN: CPT | Performed by: FAMILY MEDICINE

## 2022-06-15 NOTE — PROGRESS NOTES
Assessment & Plan     Rash and nonspecific skin eruption  - Lyme Disease Total Abs Bld with Reflex to Confirm CLIA  - Lyme Disease Total Abs Bld with Reflex to Confirm CLIA     Patient requested lymes disease testing given the nature of her walks in tall grassy areas.   For the itchy skin lesion on the back of her leg I recommended she use hydrocortisone cream twice a day for several days likely histamine mediated reaction given the quality of the reported itching.  This area does not appear to be a bull's-eye rash  Rishabh Tineo MD   [facility]    Subjective     Arlene is a 66 year old female who presents to clinic today for the following health issues:  Chief Complaint   Patient presents with     Urgent Care     Insect Bites     C/o tick bites and lyme's disease last week. Redness behind Rt calf.     HPI    Patient reports in the back of her right calf area there is a red area of skin is itchy she is uncertain if this is a bull's-eye lesion possibly related to ticks.  She does frequently walk through tall grassy areas although has not seen any attached ticks.  Her last possible exposure was about a week ago.  She would like to have testing today to screen for Lyme's disease.  She denies any abnormal headaches or body aches outside of the usual soreness that accompanies prolonged time spent working in a chair for remote work.     Denies any new fevers.      Patient Active Problem List    Diagnosis Date Noted     Raynaud's disease without gangrene 04/25/2017     Priority: Medium     Well woman exam (no gynecological exam) 07/28/2016     Priority: Medium     Fatigue 07/03/2015     Priority: Medium     CARDIOVASCULAR SCREENING; LDL GOAL LESS THAN 160 05/09/2010     Priority: Medium     Genital herpes 05/16/2005     Priority: Medium     Problem list name updated by automated process. Provider to review       Irritable bowel syndrome 05/16/2005     Priority: Medium       Current Outpatient Medications  "  Medication     MULTIVITAMINS OR TABS     OMEGA-3 & OMEGA-6 FISH OIL OR CAPS     valACYclovir (VALTREX) 500 MG tablet     ketoconazole (NIZORAL) 2 % external shampoo     VITAMIN B COMPLEX OR TABS     No current facility-administered medications for this visit.         Objective    /78   Pulse 72   Temp 98.1  F (36.7  C) (Temporal)   Resp 18   Ht 1.6 m (5' 3\")   Wt 52.6 kg (116 lb)   LMP 04/20/2015   SpO2 99%   BMI 20.55 kg/m    Physical Exam     R posterior leg: lesion < 1 inch diameter round and erythematous without drainage          No results found for any visits on 06/15/22.            The use of Dragon/[employer] dictation services may have been used to construct the content in this note; any grammatical or spelling errors are non-intentional. Please contact the author of this note directly if you are in need of any clarification.   "

## 2022-06-15 NOTE — PATIENT INSTRUCTIONS
If you develop new headaches, body aches, fever, joint discomfort please seek medical attention to discuss your symptoms    Your blood test to screen for Lyme disease should return the next 2 to 3 days we will call if positive otherwise [facility]    For the itchy area on your skin you can use topical hydrocortisone cream which can be purchased over-the-counter.  Apply this twice daily for up to a week

## 2022-06-16 LAB — B BURGDOR IGG+IGM SER QL: 0.27

## 2022-06-20 ENCOUNTER — TRANSFERRED RECORDS (OUTPATIENT)
Dept: HEALTH INFORMATION MANAGEMENT | Facility: CLINIC | Age: 67
End: 2022-06-20

## 2022-06-20 LAB
ALT SERPL-CCNC: 14 IU/L (ref 0–32)
AST SERPL-CCNC: 23 IU/L (ref 0–40)
CHOLESTEROL (EXTERNAL): 265 MG/DL (ref 100–199)
CREATININE (EXTERNAL): 0.83 MG/DL (ref 0.57–1)
GFR ESTIMATED (EXTERNAL): 79 ML/MIN/1.73
GLUCOSE (EXTERNAL): 86 MG/DL (ref 65–99)
HBA1C MFR BLD: 5 % (ref 4.8–5.6)
HDLC SERPL-MCNC: 77 MG/DL
LDL CHOLESTEROL CALCULATED (EXTERNAL): 173 MG/DL (ref 0–99)
POTASSIUM (EXTERNAL): 4 MMOL/L (ref 3.5–5.2)
TRIGLYCERIDES (EXTERNAL): 91 MG/DL (ref 0–149)
TSH SERPL-ACNC: 1.2 UIU/ML (ref 0.45–4.5)

## 2022-09-23 ENCOUNTER — HOSPITAL ENCOUNTER (OUTPATIENT)
Dept: MAMMOGRAPHY | Facility: CLINIC | Age: 67
Discharge: HOME | End: 2022-09-23
Admitting: FAMILY MEDICINE
Payer: COMMERCIAL

## 2022-09-23 DIAGNOSIS — Z12.31 VISIT FOR SCREENING MAMMOGRAM: ICD-10-CM

## 2022-09-23 PROCEDURE — 77067 SCR MAMMO BI INCL CAD: CPT

## 2022-10-30 ENCOUNTER — HEALTH MAINTENANCE LETTER (OUTPATIENT)
Age: 67
End: 2022-10-30

## 2022-12-12 ENCOUNTER — OFFICE VISIT (OUTPATIENT)
Dept: FAMILY MEDICINE | Facility: CLINIC | Age: 67
End: 2022-12-12
Payer: COMMERCIAL

## 2022-12-12 VITALS
WEIGHT: 114.6 LBS | OXYGEN SATURATION: 98 % | SYSTOLIC BLOOD PRESSURE: 120 MMHG | TEMPERATURE: 98.1 F | BODY MASS INDEX: 21.09 KG/M2 | DIASTOLIC BLOOD PRESSURE: 80 MMHG | RESPIRATION RATE: 20 BRPM | HEART RATE: 72 BPM | HEIGHT: 62 IN

## 2022-12-12 DIAGNOSIS — Z78.0 POST-MENOPAUSAL: ICD-10-CM

## 2022-12-12 DIAGNOSIS — Z00.00 ENCOUNTER FOR MEDICARE ANNUAL WELLNESS EXAM: Primary | ICD-10-CM

## 2022-12-12 DIAGNOSIS — L40.9 PSORIASIS: ICD-10-CM

## 2022-12-12 DIAGNOSIS — N64.59 INVERTED NIPPLE: ICD-10-CM

## 2022-12-12 DIAGNOSIS — A60.00 GENITAL HERPES SIMPLEX, UNSPECIFIED SITE: ICD-10-CM

## 2022-12-12 PROCEDURE — 99213 OFFICE O/P EST LOW 20 MIN: CPT | Mod: 25 | Performed by: FAMILY MEDICINE

## 2022-12-12 PROCEDURE — 99397 PER PM REEVAL EST PAT 65+ YR: CPT | Performed by: FAMILY MEDICINE

## 2022-12-12 RX ORDER — ESTRADIOL 0.03 MG/D
FILM, EXTENDED RELEASE TRANSDERMAL
COMMUNITY
Start: 2022-11-11

## 2022-12-12 RX ORDER — PROGESTERONE 100 MG/1
CAPSULE ORAL
COMMUNITY
Start: 2021-12-29 | End: 2022-12-12

## 2022-12-12 RX ORDER — NIACIN 500 MG
TABLET ORAL
COMMUNITY

## 2022-12-12 RX ORDER — PROGESTERONE 200 MG/1
CAPSULE ORAL
COMMUNITY
Start: 2022-10-03

## 2022-12-12 RX ORDER — ANTIARTHRITIC COMBINATION NO.2 900 MG
25 TABLET ORAL DAILY
COMMUNITY

## 2022-12-12 RX ORDER — KETOCONAZOLE 20 MG/ML
SHAMPOO TOPICAL DAILY PRN
Qty: 120 ML | Refills: 3 | Status: SHIPPED | OUTPATIENT
Start: 2022-12-12

## 2022-12-12 RX ORDER — UREA 10 %
500 LOTION (ML) TOPICAL 2 TIMES DAILY
COMMUNITY

## 2022-12-12 RX ORDER — VALACYCLOVIR HYDROCHLORIDE 500 MG/1
500 TABLET, FILM COATED ORAL 2 TIMES DAILY
Qty: 180 TABLET | Refills: 3 | Status: SHIPPED | OUTPATIENT
Start: 2022-12-12 | End: 2023-10-23

## 2022-12-12 ASSESSMENT — ACTIVITIES OF DAILY LIVING (ADL): CURRENT_FUNCTION: NO ASSISTANCE NEEDED

## 2022-12-12 NOTE — PATIENT INSTRUCTIONS
Call our clinic in the spring to schedule a bone density test at the [facility].     Preventive Health Recommendations    See your health care provider every year to  Review health changes.   Discuss preventive care.    Review your medicines if your doctor has prescribed any.  You no longer need a yearly Pap test unless you've had an abnormal Pap test in the past 10 years. If you have vaginal symptoms, such as bleeding or discharge, be sure to talk with your provider about a Pap test.  Every 1 to 2 years, have a mammogram.  If you are over 69, talk with your health care provider about whether or not you want to continue having screening mammograms.  Every 10 years, have a colonoscopy. Or, have a yearly FIT test (stool test). These exams will check for colon cancer.   Have a cholesterol test every 5 years, or more often if your doctor advises it.   Have a diabetes test (fasting glucose) every three years. If you are at risk for diabetes, you should have this test more often.   At age 65, have a bone density scan (DEXA) to check for osteoporosis (brittle bone disease).    Shots:  Get a flu shot each year.  Get a tetanus shot every 10 years.  Talk to your doctor about your pneumonia vaccines. There are now two you should receive - Pneumovax (PPSV 23) and Prevnar (PCV 13).  Talk to your pharmacist about the shingles vaccine.  Talk to your doctor about the hepatitis B vaccine.    Nutrition:   Eat at least 5 servings of fruits and vegetables each day.  Eat whole-grain bread, whole-wheat pasta and brown rice instead of white grains and rice.  Get adequate Calcium and Vitamin D.     Lifestyle  Exercise at least 150 minutes a week (30 minutes a day, 5 days a week). This will help you control your weight and prevent disease.  Limit alcohol to one drink per day.  No smoking.   Wear sunscreen to prevent skin cancer.   See your dentist twice a year for an exam and cleaning.  See your eye doctor every 1 to 2 years to screen for  conditions such as glaucoma, macular degeneration and cataracts.    Personalized Prevention Plan  You are due for the preventive services outlined below.  Your care team is available to assist you in scheduling these services.  If you have already completed any of these items, please share that information with your care team to update in your medical record.  Health Maintenance   Topic Date Due    DEXA  Never done    MEDICARE ANNUAL WELLNESS VISIT  08/04/2022    INFLUENZA VACCINE (1) 09/01/2022    BMP  10/04/2022    Pneumococcal Vaccine: 65+ Years (1 - PCV) 08/04/2022    MAMMO SCREENING  09/23/2023    ANNUAL REVIEW OF HM ORDERS  12/12/2023    FALL RISK ASSESSMENT  12/12/2023    DTAP/TDAP/TD IMMUNIZATION (3 - Td or Tdap) 08/13/2024    COLORECTAL CANCER SCREENING  10/10/2024    LIPID  10/04/2026    ADVANCE CARE PLANNING  12/12/2027    HEPATITIS C SCREENING  Completed    HIV SCREENING  Completed    PHQ-2 (once per calendar year)  Completed    ZOSTER IMMUNIZATION  Completed    COVID-19 Vaccine  Completed    IPV IMMUNIZATION  Aged Out    MENINGITIS IMMUNIZATION  Aged Out    PAP  Discontinued           Patient Education   Personalized Prevention Plan  You are due for the preventive services outlined below.  Your care team is available to assist you in scheduling these services.  If you have already completed any of these items, please share that information with your care team to update in your medical record.  Health Maintenance Due   Topic Date Due    Osteoporosis Screening  Never done    Annual Wellness Visit  08/04/2022    Flu Vaccine (1) 09/01/2022    Basic Metabolic Panel  10/04/2022    ANNUAL REVIEW OF HM ORDERS  10/04/2022    Pneumococcal Vaccine (1 - PCV) 08/04/2022

## 2022-12-12 NOTE — PROGRESS NOTES
SUBJECTIVE:   Arlene is a 67 year old who presents for Preventive Visit.     Patient has been advised of split billing requirements and indicates understanding: Yes  Are you in the first 12 months of your Medicare coverage? yes  Both eyes20/25 right eye 20/25 left eye 20/20    History of Present Illness       Reason for visit:  Annual preventative exam    She eats 4 or more servings of fruits and vegetables daily.She consumes 0 sweetened beverage(s) daily.She exercises with enough effort to increase her heart rate 20 to 29 minutes per day.  She exercises with enough effort to increase her heart rate 5 days per week.   She is not taking prescribed medications regularly due to None.  Healthy Habits:     In general, how would you rate your overall health?  Very good    Frequency of exercise:  4-5 days/week    Duration of exercise:  30-45 minutes    Taking medications regularly:  Yes    Barriers to taking medications:  None    Medication side effects:  None    Ability to successfully perform activities of daily living:  No assistance needed    Home Safety:  No safety concerns identified    Hearing Impairment:  No hearing concerns    In the past 6 months, have you been bothered by leaking of urine? Yes    In general, how would you rate your overall mental or emotional health?  Very good      PHQ-2 Total Score: 1    Additional concerns today:  No      Have you ever done Advance Care Planning? (For example, a Health Directive, POLST, or a discussion with a medical provider or your loved ones about your wishes): Yes, advance care planning is on file.       Fall risk  Fallen 2 or more times in the past year?: No  Any fall with injury in the past year?: No  click delete button to remove this line now  Cognitive Screening   1) Repeat 3 items (Leader, Season, Table)    2) Clock draw: NORMAL  3) 3 item recall:   Recalls 3 objects  Results: 3 items recalled: COGNITIVE IMPAIRMENT LESS LIKELY    Mini-CogTM Copyright S [employer].  Licensed by the author for use in [facility]; reprinted with permission ([email or web address]). All rights reserved.      Do you have sleep apnea, excessive snoring or daytime drowsiness?: no    Reviewed and updated as needed this visit by clinical staff     Meds              Reviewed and updated as needed this visit by Provider                 Social History     Tobacco Use     Smoking status: Never     Smokeless tobacco: Never   Substance Use Topics     Alcohol use: Yes     Comment: I have reduced sugar consumption since January 2017     If you drink alcohol do you typically have >3 drinks per day or >7 drinks per week? No    Alcohol Use 12/12/2022   Prescreen: >3 drinks/day or >7 drinks/week? -   Prescreen: >3 drinks/day or >7 drinks/week? No        Current providers sharing in care for this patient include:   Patient Care Team:  Kait Moss MD as PCP - General (Family Practice)  Kait Moss MD as Assigned PCP    The following health maintenance items are reviewed in Epic and correct as of today:  Health Maintenance   Topic Date Due     DEXA  Never done     MEDICARE ANNUAL WELLNESS VISIT  08/04/2022     INFLUENZA VACCINE (1) 09/01/2022     BMP  10/04/2022     Pneumococcal Vaccine: 65+ Years (1 - PCV) 08/04/2022     MAMMO SCREENING  09/23/2023     ANNUAL REVIEW OF HM ORDERS  12/12/2023     FALL RISK ASSESSMENT  12/12/2023     DTAP/TDAP/TD IMMUNIZATION (3 - Td or Tdap) 08/13/2024     COLORECTAL CANCER SCREENING  10/10/2024     LIPID  10/04/2026     ADVANCE CARE PLANNING  12/12/2027     HEPATITIS C SCREENING  Completed     HIV SCREENING  Completed     PHQ-2 (once per calendar year)  Completed     ZOSTER IMMUNIZATION  Completed     COVID-19 Vaccine  Completed     IPV IMMUNIZATION  Aged Out     MENINGITIS IMMUNIZATION  Aged Out     PAP  Discontinued        Last 3 Pap and HPV Results:   PAP / HPV Latest Ref Rng & Units 8/9/2018 7/3/2015 7/24/2012   PAP (Historical) - NIL NIL NIL   HPV16 NEG:Negative  "Negative - -   HPV18 NEG:Negative Negative - -   HRHPV NEG:Negative Negative - -       FHS-7:   Breast CA Risk Assessment (FHS-7) 9/3/2021 10/4/2021   Did any of your first-degree relatives have breast or ovarian cancer? Yes Yes   Did any of your relatives have bilateral breast cancer? No No   Did any man in your family have breast cancer? No No   Did any woman in your family have breast and ovarian cancer? No Yes   Did any woman in your family have breast cancer before age 50 y? No No   Do you have 2 or more relatives with breast and/or ovarian cancer? No No   Do you have 2 or more relatives with breast and/or bowel cancer? No No            Review of Systems  Constitutional, HEENT, cardiovascular, pulmonary, GI, , musculoskeletal, neuro, skin, endocrine and psych systems are negative, except as otherwise noted.    OBJECTIVE:   /80 (BP Location: Right arm, Patient Position: Sitting, Cuff Size: Adult Regular)   Pulse 72   Temp 98.1  F (36.7  C) (Temporal)   Resp 20   Ht 1.58 m (5' 2.21\")   Wt 52 kg (114 lb 9.6 oz)   LMP 04/20/2015   SpO2 98%   BMI 20.82 kg/m   Estimated body mass index is 20.82 kg/m  as calculated from the following:    Height as of this encounter: 1.58 m (5' 2.21\").    Weight as of this encounter: 52 kg (114 lb 9.6 oz).  Physical Exam  GENERAL APPEARANCE: healthy, alert and no distress  EYES: Eyes grossly normal to inspection, PERRL and conjunctivae and sclerae normal  HENT: ear canals and TM's normal, nose and mouth without ulcers or lesions, oropharynx clear and oral mucous membranes moist  NECK: no adenopathy, no asymmetry, masses, or scars and thyroid normal to palpation  RESP: lungs clear to auscultation - no rales, rhonchi or wheezes  CV: regular rate and rhythm, normal S1 S2, no S3 or S4, no murmur, click or rub, no peripheral edema and peripheral pulses strong  ABDOMEN: soft, nontender, no hepatosplenomegaly, no masses and bowel sounds normal  MS: no musculoskeletal defects " are noted and gait is age appropriate without ataxia  SKIN: no suspicious lesions or rashes  NEURO: Normal strength and tone, sensory exam grossly normal, mentation intact and speech normal  PSYCH: mentation appears normal and affect normal/bright    Diagnostic Test Results:  Labs reviewed in Epic    ASSESSMENT / PLAN:       ICD-10-CM    1. Encounter for Medicare annual wellness exam  Z00.00       2. Genital herpes simplex, unspecified site  A60.00 valACYclovir (VALTREX) 500 MG tablet      3. Post-menopausal  Z78.0 DEXA HIP/PELVIS/SPINE - Future      4. Psoriasis  L40.9 ketoconazole (NIZORAL) 2 % external shampoo        Routine general medical examination at a [facility] facility  Pap with HPV cotesting  completed in 2018 and was normal.  Had three normal paps since 2012. Never had an abnormal pap test. No further paps are indicated.   mammogram completed 9/22  She completed cologuard test 10/21. Repeat due 10/2024  Flu shot recommended today - she would like to wait on this and the PCV-20 until she gets back from her trip.    COVID-19 vaccine is up to date.    PCV-20 recommended     Genital herpes simplex, unspecified site  Refilled valtrex today   She showed me recent labs through [employer] through [employer]. She will send me results in tic. Liver function and kidney function were normal.      Post menopausal  DEXA recommended - I recommend scheduling in the spring at our new clinic    She sees Dr. Manzano for hormone replacement. Estrogen and progresterone are refilled through Dr. Manzano      COUNSELING:  Reviewed preventive health counseling, as reflected in patient instructions        She reports that she has never smoked. She has never used smokeless tobacco.      Appropriate preventive services were discussed with this patient, including applicable screening as appropriate for cardiovascular disease, diabetes, osteopenia/osteoporosis, and glaucoma.  As appropriate for age/gender, discussed screening for  colorectal cancer, prostate cancer, breast cancer, and cervical cancer. Checklist reviewing preventive services available has been given to the patient.    Reviewed patients plan of care and provided an AVS. The Basic Care Plan (routine screening as documented in Health Maintenance) for Arlene meets the Care Plan requirement. This Care Plan has been established and reviewed with the Patient.       Kait Moss MD  [facility]    Identified Health Risks:

## 2023-04-04 ENCOUNTER — OFFICE VISIT (OUTPATIENT)
Dept: URGENT CARE | Facility: URGENT CARE | Age: 68
End: 2023-04-04
Payer: COMMERCIAL

## 2023-04-04 VITALS
HEART RATE: 75 BPM | OXYGEN SATURATION: 100 % | BODY MASS INDEX: 20.02 KG/M2 | DIASTOLIC BLOOD PRESSURE: 73 MMHG | TEMPERATURE: 98.1 F | HEIGHT: 63 IN | SYSTOLIC BLOOD PRESSURE: 116 MMHG | WEIGHT: 113 LBS

## 2023-04-04 DIAGNOSIS — R07.0 THROAT PAIN: Primary | ICD-10-CM

## 2023-04-04 DIAGNOSIS — J02.9 THROAT INFECTION: ICD-10-CM

## 2023-04-04 DIAGNOSIS — K04.7 DENTAL INFECTION: ICD-10-CM

## 2023-04-04 LAB
DEPRECATED S PYO AG THROAT QL EIA: NEGATIVE
GROUP A STREP BY PCR: NOT DETECTED

## 2023-04-04 PROCEDURE — 99213 OFFICE O/P EST LOW 20 MIN: CPT | Performed by: NURSE PRACTITIONER

## 2023-04-04 PROCEDURE — 87651 STREP A DNA AMP PROBE: CPT | Performed by: NURSE PRACTITIONER

## 2023-04-04 RX ORDER — CLINDAMYCIN HCL 300 MG
300 CAPSULE ORAL 3 TIMES DAILY
Qty: 30 CAPSULE | Refills: 0 | Status: SHIPPED | OUTPATIENT
Start: 2023-04-04 | End: 2023-04-14

## 2023-04-04 NOTE — PROGRESS NOTES
Chief Complaint   Patient presents with     Urgent Care     Pt in clinic to have eval for sore throat.     Throat Pain     SUBJECTIVE:  Arlene Beck is a 67 year old female presenting with a sore throat swollen lymph nodes thick white patches and pus coming from the lower anterior teeth worsening in the last couple days.  Her dentist had to cancel on her and rescheduled the appointment for late in the month.  No fevers sweats chills nausea vomiting headache.  She has had a tonsillectomy.  She had a questionable allergic reaction to Augmentin once with lip swelling and odd sensation.  She was immediately switched to Z-Quinn and did not have problems after that.    Past Medical History:   Diagnosis Date     COPD (chronic obstructive pulmonary disease) (H)     I'm not sure if what I get rises to this condition     Fatigue 7/3/2015     Genital herpes, unspecified      Irritable bowel syndrome      Uncomplicated asthma     I have had ALLERGIC reaction asthma     dhea 25 MG TABS, Take 25 mg by mouth daily 2 tabs daily  estradiol (VIVELLE-DOT) 0.025 MG/24HR bi-weekly patch,   ketoconazole (NIZORAL) 2 % external shampoo, Apply topically daily as needed for itching or irritation  magnesium gluconate (MAGONATE) 500 (27 Mg) MG tablet, Take 500 mg by mouth 2 times daily 2 caps daily  MULTIVITAMINS OR TABS, one daily, Dr Silva's  super food, takes 4 daily  niacin 500 MG tablet, Take by mouth daily (with breakfast) 3 times weekly  OMEGA-3 & OMEGA-6 FISH OIL OR CAPS,   progesterone (PROMETRIUM) 200 MG capsule,   valACYclovir (VALTREX) 500 MG tablet, Take 1 tablet (500 mg) by mouth 2 times daily  VITAMIN B COMPLEX OR TABS, takes two daily    No current facility-administered medications on file prior to visit.    Social History     Tobacco Use     Smoking status: Never     Smokeless tobacco: Never   Vaping Use     Vaping status: Not on file   Substance Use Topics     Alcohol use: Yes     Comment: I have reduced sugar  "consumption since January 2017     Allergies   Allergen Reactions     Codeine      Sulfa Drugs        Review of Systems   All systems negative except for those listed above in HPI.    OBJECTIVE:   /73   Pulse 75   Temp 98.1  F (36.7  C) (Temporal)   Ht 1.6 m (5' 3\")   Wt 51.3 kg (113 lb)   LMP 04/20/2015   SpO2 100%   BMI 20.02 kg/m       Physical Exam  Vitals reviewed.   Constitutional:       General: She is not in acute distress.     Appearance: Normal appearance. She is well-developed. She is not ill-appearing, toxic-appearing or diaphoretic.   HENT:      Head: Normocephalic and atraumatic.      Mouth/Throat:      Pharynx: Oropharyngeal exudate and posterior oropharyngeal erythema present.      Comments: Thick white patches in posterior oropharynx thick white pus coming from anterior lower teeth when squeezed.  Eyes:      Conjunctiva/sclera: Conjunctivae normal.      Pupils: Pupils are equal, round, and reactive to light.   Cardiovascular:      Rate and Rhythm: Normal rate.      Pulses: Normal pulses.   Pulmonary:      Effort: Pulmonary effort is normal. No respiratory distress.      Breath sounds: No stridor. No wheezing.   Musculoskeletal:         General: Normal range of motion.      Cervical back: Normal range of motion and neck supple.   Lymphadenopathy:      Cervical: Cervical adenopathy present.   Skin:     General: Skin is warm.      Capillary Refill: Capillary refill takes less than 2 seconds.      Findings: No rash.   Neurological:      General: No focal deficit present.      Mental Status: She is alert and oriented to person, place, and time.   Psychiatric:         Mood and Affect: Mood normal.         Behavior: Behavior normal.       Results for orders placed or performed in visit on 04/04/23   Streptococcus A Rapid Screen w/Reflex to PCR - Clinic Collect     Status: Normal    Specimen: Throat; Swab   Result Value Ref Range    Group A Strep antigen Negative Negative     ASSESSMENT:    " "ICD-10-CM    1. Throat pain  R07.0 Streptococcus A Rapid Screen w/Reflex to PCR - Clinic Collect     Group A Streptococcus PCR Throat Swab      2. Dental infection  K04.7 clindamycin (CLEOCIN) 300 MG capsule      3. Throat infection  J02.9 clindamycin (CLEOCIN) 300 MG capsule          PLAN:     It is important that you make an appointment with a dentist.  Take antibiotic as directed.  Brush teeth twice daily  Salt water gargles- mix about 8 oz of water with about 1 tsp of salt. Swish and spit.  Take Tylenol or an NSAID such as ibuprofen or naproxen as needed for pain.  May take up to 1000 mg of Tylenol every 6-8 hours- do not exceed 4000 mg in 24 hours.  May take up to 600 mg ibuprofen every 6-8 hours.  Alternate Tylenol and Ibuprofen every 3-4 hours.  Please follow up with primary care provider if not improving, worsening or new symptoms or for any adverse reactions to medications.     Some patients with mild infection may also receive oral therapy initially, pending dental or surgical evaluation. The preferred oral regimen is amoxicillin-clavulanate (875 mg orally twice daily). For penicillin-allergic patients, we usually give clindamycin 300 mg to 450 mg orally three times daily. Antibiotics should be continued until local inflammation has resolved completely, typically for a total of 7 to 14 days (UpToDate, 2019).    Strep negative, will still treat with clinda given appearance and pain per CENTOR criteria  Drink plenty of fluids and rest.  May use salt water gargles- about 8 oz warm water with about 1 teaspoon salt  Sucrets and Cepacol spray are over the counter medications that numb the throat.  Over the counter pain relievers such as tylenol or ibuprofen may be used as needed.   Honey lemon tea helps to soothe the throat. \"Throat Coat\" tea is soothing as well.  Change toothbrush after 24 hours of antibiotics (may soak in 3-6% hydrogen peroxide)  Will be contagious for 24 hours after starting antibiotic  May " return to school//work/activities 24 hours after antibiotics are started.  Wash hands frequently and do not share beverages.  Please follow up with primary care provider if symptoms are not improving, worsening or new symptoms or for any adverse reactions to medications.     Follow up with primary care provider with any problems, questions or concerns or if symptoms worsen or fail to improve. Patient agreed to plan and verbalized understanding.    MORELIA Aguero-BC  [facility] CARE [place]

## 2023-04-04 NOTE — PATIENT INSTRUCTIONS
"It is important that you make an appointment with a dentist.  Take antibiotic as directed.  Brush teeth twice daily  Salt water gargles- mix about 8 oz of water with about 1 tsp of salt. Swish and spit.  Take Tylenol or an NSAID such as ibuprofen or naproxen as needed for pain.  May take up to 1000 mg of Tylenol every 6-8 hours- do not exceed 4000 mg in 24 hours.  May take up to 600 mg ibuprofen every 6-8 hours.  Alternate Tylenol and Ibuprofen every 3-4 hours.  Please follow up with primary care provider if not improving, worsening or new symptoms or for any adverse reactions to medications.     Some patients with mild infection may also receive oral therapy initially, pending dental or surgical evaluation. The preferred oral regimen is amoxicillin-clavulanate (875 mg orally twice daily). For penicillin-allergic patients, we usually give clindamycin 300 mg to 450 mg orally three times daily. Antibiotics should be continued until local inflammation has resolved completely, typically for a total of 7 to 14 days (UpToDate, 2019).    Strep negative, will still treat with clinda given appearance and pain per CENTOR criteria  Drink plenty of fluids and rest.  May use salt water gargles- about 8 oz warm water with about 1 teaspoon salt  Sucrets and Cepacol spray are over the counter medications that numb the throat.  Over the counter pain relievers such as tylenol or ibuprofen may be used as needed.   Honey lemon tea helps to soothe the throat. \"Throat Coat\" tea is soothing as well.  Change toothbrush after 24 hours of antibiotics (may soak in 3-6% hydrogen peroxide)  Will be contagious for 24 hours after starting antibiotic  May return to school//work/activities 24 hours after antibiotics are started.  Wash hands frequently and do not share beverages.  Please follow up with primary care provider if symptoms are not improving, worsening or new symptoms or for any adverse reactions to medications.   "

## 2023-08-03 ENCOUNTER — TRANSFERRED RECORDS (OUTPATIENT)
Dept: HEALTH INFORMATION MANAGEMENT | Facility: CLINIC | Age: 68
End: 2023-08-03

## 2023-08-03 LAB
CHOLESTEROL (EXTERNAL): 259 MG/DL (ref 100–199)
CREATININE (EXTERNAL): 0.9 MG/DL (ref 0.57–1)
GFR ESTIMATED (EXTERNAL): 71 ML/MIN/1.73
GLUCOSE (EXTERNAL): 85 MG/DL (ref 70–99)
HBA1C MFR BLD: 5 % (ref 4.8–5.6)
HDLC SERPL-MCNC: 82 MG/DL
LDL CHOLESTEROL CALCULATED (EXTERNAL): 162 MG/DL (ref 0–99)
POTASSIUM (EXTERNAL): 5.7 MMOL/L (ref 3.5–5.2)
TRIGLYCERIDES (EXTERNAL): 87 MG/DL (ref 0–149)
TSH SERPL-ACNC: 1.09 UIU/ML (ref 0.45–4.5)

## 2023-08-24 ENCOUNTER — PATIENT OUTREACH (OUTPATIENT)
Dept: CARE COORDINATION | Facility: CLINIC | Age: 68
End: 2023-08-24
Payer: COMMERCIAL

## 2023-10-06 ENCOUNTER — HOSPITAL ENCOUNTER (OUTPATIENT)
Dept: MAMMOGRAPHY | Facility: CLINIC | Age: 68
Discharge: HOME | End: 2023-10-06
Attending: FAMILY MEDICINE | Admitting: FAMILY MEDICINE
Payer: COMMERCIAL

## 2023-10-06 DIAGNOSIS — Z12.31 VISIT FOR SCREENING MAMMOGRAM: ICD-10-CM

## 2023-10-06 PROCEDURE — 77067 SCR MAMMO BI INCL CAD: CPT

## 2023-10-23 ENCOUNTER — OFFICE VISIT (OUTPATIENT)
Dept: URGENT CARE | Facility: URGENT CARE | Age: 68
End: 2023-10-23
Payer: COMMERCIAL

## 2023-10-23 ENCOUNTER — NURSE TRIAGE (OUTPATIENT)
Dept: FAMILY MEDICINE | Facility: CLINIC | Age: 68
End: 2023-10-23
Payer: COMMERCIAL

## 2023-10-23 VITALS
HEIGHT: 63 IN | OXYGEN SATURATION: 100 % | HEART RATE: 59 BPM | DIASTOLIC BLOOD PRESSURE: 74 MMHG | SYSTOLIC BLOOD PRESSURE: 123 MMHG | WEIGHT: 113 LBS | BODY MASS INDEX: 20.02 KG/M2 | TEMPERATURE: 97.3 F

## 2023-10-23 DIAGNOSIS — A60.00 GENITAL HERPES SIMPLEX, UNSPECIFIED SITE: ICD-10-CM

## 2023-10-23 PROCEDURE — 99213 OFFICE O/P EST LOW 20 MIN: CPT | Performed by: PHYSICIAN ASSISTANT

## 2023-10-23 RX ORDER — CHOLECALCIFEROL (VITAMIN D3) 10(400)/ML
DROPS ORAL DAILY
COMMUNITY

## 2023-10-23 RX ORDER — VALACYCLOVIR HYDROCHLORIDE 500 MG/1
500 TABLET, FILM COATED ORAL 2 TIMES DAILY
Qty: 180 TABLET | Refills: 3 | Status: SHIPPED | OUTPATIENT
Start: 2023-10-23 | End: 2023-11-02

## 2023-10-23 NOTE — PROGRESS NOTES
Genital herpes simplex, unspecified site  - valACYclovir (VALTREX) 500 MG tablet; Take 1 tablet (500 mg) by mouth 2 times daily    Patient's rash does not necessarily look like herpes zoster however it is difficult to tell as she has been taking antiviral medication for the last couple of weeks.  I advised the patient to take a stronger dose for the next week and follow-up with primary care provider if no improvement in that time.    Phu Horton PA-C  [facility] URGENT CARE    Subjective   68 year old who presents to clinic today for the following health issues:    Urgent Care and Rash       HPI     Rash  Onset/Duration: Pt in clinic to have eval for rash on upper right thigh and pelvic area. Patient takes valtrex for prevention of genital herpes 500 mg -1000 mg daily.   Description  Location: Right thigh and right groin   Character: blotchy, raised, red, blistered   Itching: moderate and burning   Intensity:  moderate  Progression of Symptoms:  worsening  Accompanying signs and symptoms:   Fever: No  Body aches or joint pain: No  Sore throat symptoms: No  Recent cold symptoms: No  History:           Previous episodes of similar rash: Patient has had frequent herpes flares   New exposures:  None  Recent travel: No  Exposure to similar rash: No      Review of Systems   Review of Systems   See HPI    Objective    Temp: 97.3  F (36.3  C) Temp src: Temporal BP: 123/74 Pulse: 59     SpO2: 100 %       Physical Exam   Physical Exam  Constitutional:       General: She is not in acute distress.     Appearance: Normal appearance. She is normal weight. She is not ill-appearing, toxic-appearing or diaphoretic.   HENT:      Head: Normocephalic and atraumatic.   Cardiovascular:      Rate and Rhythm: Normal rate.      Pulses: Normal pulses.   Pulmonary:      Effort: Pulmonary effort is normal. No respiratory distress.   Skin:            Comments: Patient has scattered maculopapular rash in the area shown above but  there does not seem to be any crusting or blisters present.  The skin is slightly tender to touch.   Neurological:      General: No focal deficit present.      Mental Status: She is alert and oriented to person, place, and time. Mental status is at baseline.      Gait: Gait normal.   Psychiatric:         Mood and Affect: Mood normal.         Behavior: Behavior normal.         Thought Content: Thought content normal.         Judgment: Judgment normal.          No results found for this or any previous visit (from the past 24 hour(s)).

## 2023-10-24 ENCOUNTER — TELEPHONE (OUTPATIENT)
Dept: URGENT CARE | Facility: URGENT CARE | Age: 68
End: 2023-10-24
Payer: COMMERCIAL

## 2023-10-24 DIAGNOSIS — A60.00 GENITAL HERPES SIMPLEX, UNSPECIFIED SITE: Primary | ICD-10-CM

## 2023-10-24 NOTE — TELEPHONE ENCOUNTER
PT was calling about this rx.  She needs this rx was for 2000 mg per day.  The 1000 mg is pt's normal dose and it is too early to fill.  Please send in a new 2000 mg rx.    Sending to [facility] staff.  Florecita Lombardi RN-[facility]

## 2023-10-24 NOTE — TELEPHONE ENCOUNTER
Pt states she is supposed to be taking 2000mg per day, but the rx is written for 1000mg per day. If appropriate, please send new rx with updated directions to [facility].     Thanks,   Dayami Medina, PhT  [facility] Float Department

## 2023-10-25 ENCOUNTER — TELEPHONE (OUTPATIENT)
Dept: FAMILY MEDICINE | Facility: CLINIC | Age: 68
End: 2023-10-25
Payer: COMMERCIAL

## 2023-10-25 NOTE — TELEPHONE ENCOUNTER
I spoke with patient twice earlier today and informed her we would route this to Phu Horton PA-C because he was the provider that saw her. Patient understood this when we chatted.     Called patient again and informed that Phu would be in tomorrow to address this. Pt understands  Kim Dixon, CMA

## 2023-10-25 NOTE — TELEPHONE ENCOUNTER
Spoke with patient and she stated she was told to go to 2000 mg a day and Phu wrote rx but because it's the same dose she has been prescribed before, she can't fill it because it's too soon. Pt would like new rx with different dose. .  Kim Dixon, FLO    Per Jane Portillo CNP, this needs to be addressed by provider she saw, will route to this provider.

## 2023-10-25 NOTE — TELEPHONE ENCOUNTER
Patient Returning Call    Reason for call:  pt looking to speak with vlad stephens, wants a call back regarding prescription from 10/23 - he needs to make adjustment on prescription before pharmacy will fill it, pt will run out of meds on Friday    Information relayed to patient:  she is frustrated and is expecting a call back    Patient has additional questions:        Could we send this information to you in [facility] or would you prefer to receive a phone call?:   Patient would prefer a phone call     Okay to leave a detailed message?: Yes at Home number on file [phone] (home)

## 2023-10-26 RX ORDER — VALACYCLOVIR HYDROCHLORIDE 1 G/1
2000 TABLET, FILM COATED ORAL DAILY
Qty: 14 TABLET | Refills: 0 | Status: SHIPPED | OUTPATIENT
Start: 2023-10-26 | End: 2024-01-08

## 2023-11-01 DIAGNOSIS — A60.00 GENITAL HERPES SIMPLEX, UNSPECIFIED SITE: ICD-10-CM

## 2023-11-01 RX ORDER — VALACYCLOVIR HYDROCHLORIDE 500 MG/1
500 TABLET, FILM COATED ORAL 2 TIMES DAILY
Qty: 180 TABLET | Refills: 3 | Status: CANCELLED | OUTPATIENT
Start: 2023-11-01

## 2023-11-01 NOTE — TELEPHONE ENCOUNTER
Patient calling stated that she will be out of medication tomorrow and is wanting prescription sent to [facility]. Patient stated that she doesn't have her annual physical until January 2024. Patient wanting refill to last until she comes in for visit.

## 2023-11-02 RX ORDER — VALACYCLOVIR HYDROCHLORIDE 500 MG/1
500 TABLET, FILM COATED ORAL 2 TIMES DAILY
Qty: 180 TABLET | Refills: 3 | Status: SHIPPED | OUTPATIENT
Start: 2023-11-02 | End: 2024-01-08

## 2023-11-02 NOTE — TELEPHONE ENCOUNTER
Team Coordinators-Please contact patient.  This medication was ordered for a 1 year supply by Urgent Care provider on 10/23/23.  Patient to please follow up with [facility] - [place], [place] - [address].    Thank you!  ESTHER GarrettN, RN-[facility]

## 2023-11-02 NOTE — TELEPHONE ENCOUNTER
Follow up with pharmacy and they stated that they have no current script it was cancelled via PPI on 10/23/23.

## 2023-11-17 ENCOUNTER — TELEPHONE (OUTPATIENT)
Dept: FAMILY MEDICINE | Facility: CLINIC | Age: 68
End: 2023-11-17
Payer: COMMERCIAL

## 2023-11-17 NOTE — TELEPHONE ENCOUNTER
Patient called and requested a PA since insurance will only give patient 15 days worth of the medication instead of the full 3 months.

## 2023-11-18 ENCOUNTER — HEALTH MAINTENANCE LETTER (OUTPATIENT)
Age: 68
End: 2023-11-18

## 2023-11-21 NOTE — TELEPHONE ENCOUNTER
Central Prior Authorization Team  Phone: [phone]    PA Initiation    Medication: VALACYCLOVIR  MG PO TABS  Insurance Company: [employer] Non-Specialty PA's - Phone [phone] Fax [phone]  Pharmacy Filling the Rx: [facility] - [place], [place] - [address]  Filling Pharmacy Phone: [phone]  Filling Pharmacy Fax:    Start Date: 11/21/2023  Initiated pa by phone. PA caseID# [id number]

## 2023-11-22 NOTE — TELEPHONE ENCOUNTER
Prior Authorization Approval    Medication: VALACYCLOVIR  MG PO TABS  Authorization Effective Date: 10/22/2023  Authorization Expiration Date: 11/20/2024  Approved Dose/Quantity: 180 per 90 days   Reference #:     Insurance Company: [employer] Non-Specialty PA's - Phone [phone] Fax [phone]  Expected CoPay: $    CoPay Card Available:      Financial Assistance Needed:   Which Pharmacy is filling the prescription: [facility] - [place], [place] - [address] [place] [address]  Pharmacy Notified: Yes  Patient Notified: **Instructed pharmacy to notify patient when script is ready to /ship.**

## 2024-01-08 ENCOUNTER — VIRTUAL VISIT (OUTPATIENT)
Dept: FAMILY MEDICINE | Facility: CLINIC | Age: 69
End: 2024-01-08
Attending: FAMILY MEDICINE
Payer: COMMERCIAL

## 2024-01-08 DIAGNOSIS — Z00.00 ENCOUNTER FOR MEDICARE ANNUAL WELLNESS EXAM: Primary | ICD-10-CM

## 2024-01-08 DIAGNOSIS — Z13.220 LIPID SCREENING: ICD-10-CM

## 2024-01-08 DIAGNOSIS — L98.9 SKIN LESION: ICD-10-CM

## 2024-01-08 DIAGNOSIS — I73.00 RAYNAUD'S DISEASE WITHOUT GANGRENE: ICD-10-CM

## 2024-01-08 DIAGNOSIS — Z78.0 POST-MENOPAUSAL: ICD-10-CM

## 2024-01-08 DIAGNOSIS — A60.00 GENITAL HERPES SIMPLEX, UNSPECIFIED SITE: ICD-10-CM

## 2024-01-08 PROCEDURE — 99397 PER PM REEVAL EST PAT 65+ YR: CPT | Mod: 95 | Performed by: FAMILY MEDICINE

## 2024-01-08 RX ORDER — VALACYCLOVIR HYDROCHLORIDE 500 MG/1
500 TABLET, FILM COATED ORAL 2 TIMES DAILY
Qty: 180 TABLET | Refills: 3 | Status: SHIPPED | OUTPATIENT
Start: 2024-01-08

## 2024-01-08 ASSESSMENT — ENCOUNTER SYMPTOMS
PARESTHESIAS: 1
HEMATURIA: 0
ABDOMINAL PAIN: 0
WEAKNESS: 0
FREQUENCY: 0
EYE PAIN: 0
FEVER: 0
SHORTNESS OF BREATH: 0
NAUSEA: 0
HEARTBURN: 0
MYALGIAS: 0
BREAST MASS: 0
DYSURIA: 0
CONSTIPATION: 0
JOINT SWELLING: 0
DIARRHEA: 0
NERVOUS/ANXIOUS: 0
COUGH: 1
PALPITATIONS: 0
DIZZINESS: 0
SORE THROAT: 1
HEADACHES: 1
HEMATOCHEZIA: 0
CHILLS: 1
ARTHRALGIAS: 0

## 2024-01-08 ASSESSMENT — ACTIVITIES OF DAILY LIVING (ADL): CURRENT_FUNCTION: NO ASSISTANCE NEEDED

## 2024-01-08 NOTE — PATIENT INSTRUCTIONS
Schedule with dermatology for the two spots that have not been healing.   Schedule with vascular specialist for management of Raynaud's and episodes of chilblains  Schedule a bone density test (DEXA)  You are due for your tetanus vaccine, COVID vaccine, Influenza vaccine and RSV vaccine. They can be done together or separately.

## 2024-01-08 NOTE — PROGRESS NOTES
"SUBJECTIVE:     Arlene is a 68 year old, presenting for a video visit for the following:  Annual Visit (PT has some concerns about her medications, Pt will also like you discuss about some other health issues )    How would you like to obtain your AVS? Mail a copy and [employer]  If the video visit is dropped, the invitation should be resent by: Send to e-mail at: [email or web address]  Will anyone else be joining your video visit? No      Roomed by: Imelda Capellan    Are you in the first 12 months of your Medicare coverage?  No    Healthy Habits:     In general, how would you rate your overall health?  Good    Frequency of exercise:  4-5 days/week    Duration of exercise:  15-30 minutes    Do you usually eat at least 4 servings of fruit and vegetables a day, include whole grains    & fiber and avoid regularly eating high fat or \"junk\" foods?  Yes    Taking medications regularly:  Yes    Medication side effects:  None    Ability to successfully perform activities of daily living:  No assistance needed    Home Safety:  Lack of grab bars in the bathroom    Hearing Impairment:  No hearing concerns    In the past 6 months, have you been bothered by leaking of urine? Yes    In general, how would you rate your overall mental or emotional health?  Good    Additional concerns today:  No    Sleep is better. Vaginal dryness has improved. Hair loss has done better all with hormone therapy.     Since the fall, her hands and feet get cold even with mild chill in the house. Wears gloves in the house.     Harder for her body to stay warm when the temps are in the just above freezing range than when more cold out oddly.   When she goes out to walk she has to have toe warmers in. If does not put those in, bottoms of feet are totally while and numb and cannot finish even a 30 minute walk. Has to warm them in warm water.     Winds up doing more in house eerise and yoga rather than exercising outside.     This year has been different.     Has " had smaller episodes in the past. This year much harder.     More her feet than her hands. If she is putting too uch from the fridge to her cart her hands get white.     Also has episodes of blisters on her feet. Looks more like chilblains. Right foot second and fourth toes most impacted. Second toe swells up at the top. When that swells then her shoes are not fitting well.     Had oral surgery with gum graft reently.     Two skin issues that do not seem to heal. One at her eyebrow and the other is above the wrist.     Today's PHQ-2 Score:       1/8/2024     3:25 PM   PHQ-2 ( 1999 [employer])   Q1: Little interest or pleasure in doing things 0   Q2: Feeling down, depressed or hopeless 0   PHQ-2 Score 0   Q1: Little interest or pleasure in doing things Not at all   Q2: Feeling down, depressed or hopeless Not at all   PHQ-2 Score 0           Have you ever done Advance Care Planning? (For example, a Health Directive, POLST, or a discussion with a medical provider or your loved ones about your wishes): Yes, advance care planning is on file.       Fall risk  Fallen 2 or more times in the past year?: No  Any fall with injury in the past year?: No    Cognitive Screening Unable to complete due to virtual visit; need for additional assessment in future face-to-face visit    Do you have sleep apnea, excessive snoring or daytime drowsiness? : no    Reviewed and updated as needed this visit by clinical staff   Tobacco  Allergies  Meds              Reviewed and updated as needed this visit by Provider                 Social History     Tobacco Use    Smoking status: Never    Smokeless tobacco: Never   Substance Use Topics    Alcohol use: Not Currently     Comment: I have reduced sugar consumption since January 2017 1/8/2024     3:24 PM   Alcohol Use   Prescreen: >3 drinks/day or >7 drinks/week? No     Do you have a current opioid prescription? No  Do you use any other controlled substances or medications that are not  prescribed by a provider? None     Current providers sharing in care for this patient include:   Patient Care Team:  Kait Moss MD as PCP - General (Family Medicine)  Kait Moss MD as Assigned PCP    The following health maintenance items are reviewed in Epic and correct as of today:  Health Maintenance   Topic Date Due    DEXA  Never done    IPV IMMUNIZATION (2 of 3 - Adult catch-up series) 02/02/2007    RSV VACCINE (Pregnancy & 60+) (1 - 1-dose 60+ series) Never done    Pneumococcal Vaccine: 65+ Years (1 of 1 - PCV) Never done    BMP  10/04/2022    INFLUENZA VACCINE (1) 09/01/2023    COVID-19 Vaccine (5 - 2023-24 season) 09/01/2023    ANNUAL REVIEW OF HM ORDERS  12/12/2023    DTAP/TDAP/TD IMMUNIZATION (4 - Td or Tdap) 08/13/2024    MAMMO SCREENING  10/06/2024    COLORECTAL CANCER SCREENING  10/10/2024    MEDICARE ANNUAL WELLNESS VISIT  01/08/2025    FALL RISK ASSESSMENT  01/08/2025    LIPID  06/20/2027    ADVANCE CARE PLANNING  01/08/2029    HEPATITIS C SCREENING  Completed    PHQ-2 (once per calendar year)  Completed    ZOSTER IMMUNIZATION  Completed    HPV IMMUNIZATION  Aged Out    MENINGITIS IMMUNIZATION  Aged Out    RSV MONOCLONAL ANTIBODY  Aged Out    PAP  Discontinued     BP Readings from Last 3 Encounters:   10/23/23 123/74   04/04/23 116/73   12/12/22 120/80    Wt Readings from Last 3 Encounters:   10/23/23 51.3 kg (113 lb)   04/04/23 51.3 kg (113 lb)   12/12/22 52 kg (114 lb 9.6 oz)                       FHS-7:       9/3/2021     9:46 AM 10/4/2021     2:28 PM 10/6/2023     1:41 PM 1/4/2024     9:23 AM 1/8/2024     3:24 PM   Breast CA Risk Assessment (FHS-7)   Did any of your first-degree relatives have breast or ovarian cancer? Yes Yes  Yes Yes   Did any of your relatives have bilateral breast cancer? No No  No No   Did any man in your family have breast cancer? No No  No No   Did any woman in your family have breast and ovarian cancer? No Yes No Yes Yes   Did any woman in your family have  "breast cancer before age 50 y? No No No No No   Do you have 2 or more relatives with breast and/or ovarian cancer? No No No No No   Do you have 2 or more relatives with breast and/or bowel cancer? No No No No No         Pertinent mammograms are reviewed under the imaging tab.    Review of Systems   Constitutional:  Positive for chills. Negative for fever.   HENT:  Positive for sore throat. Negative for congestion, ear pain and hearing loss.    Eyes:  Negative for pain and visual disturbance.   Respiratory:  Positive for cough. Negative for shortness of breath.    Cardiovascular:  Negative for chest pain, palpitations and peripheral edema.   Gastrointestinal:  Negative for abdominal pain, constipation, diarrhea, heartburn, hematochezia and nausea.   Breasts:  Positive for tenderness. Negative for breast mass and discharge.   Genitourinary:  Positive for genital sores. Negative for dysuria, frequency, hematuria, pelvic pain, urgency, vaginal bleeding and vaginal discharge.   Musculoskeletal:  Negative for arthralgias, joint swelling and myalgias.   Skin:  Positive for rash.   Neurological:  Positive for headaches and paresthesias. Negative for dizziness and weakness.   Psychiatric/Behavioral:  Negative for mood changes. The patient is not nervous/anxious.       When she answered the questions she had been waking up coughiong, but is no longer noticing that. Also had been having headaches, which resolved. No additiomal concerns today     OBJECTIVE:   LMP 04/20/2015   Breastfeeding No  Estimated body mass index is 20.02 kg/m  as calculated from the following:    Height as of 10/23/23: 1.6 m (5' 3\").    Weight as of 10/23/23: 51.3 kg (113 lb).  Physical Exam  GENERAL: healthy, alert and no distress    Diagnostic Test Results:  Labs reviewed in Epic    ASSESSMENT / PLAN:       ICD-10-CM    1. Encounter for Medicare annual wellness exam  Z00.00       2. Post-menopausal  Z78.0 DX Hip/Pelvis/Spine      3. Genital herpes " simplex, unspecified site  A60.00 valACYclovir (VALTREX) 500 MG tablet      4. Lipid screening  Z13.220       5. Raynaud's disease without gangrene  I73.00 Vascular Surgery Referral        Routine general medical examination at a health care facility  Healthy   Pap with HPV cotesting  completed in 2018 and was normal.  Had three normal paps since 2012. Never had an abnormal pap test. No further paps are indicated.   mammogram completed 10/6/23  She completed cologuard test 10/21. Repeat due 10/2024  Immunizations: influenza, COVID, tdap and RSV vaccine recommended.       Genital herpes simplex, unspecified site  Refilled valtrex today    She reviewed recent labs through [facility] through [employer]. She will send me results in [employer]. Has these through her liver function and kidney function were normal.      Post menopausal  DEXA recommended - she will schedule    Raynaud's  Chillblains   Epsom salt baths   Would like to avoid medications.     Menopause  She sees Dr. Manzano for hormone therapy. Estrogen and progresterone are refilled through Dr. Manzano    Patient Instructions   Schedule with dermatology for the two spots that have not been healing.   Schedule with vascular specialist for management of Raynaud's and episodes of chilblains  Schedule a bone density test (DEXA)  You are due for your tetanus vaccine, COVID vaccine, Influenza vaccine and RSV vaccine. They can be done together or separately.           Patient has been advised of split billing requirements and indicates understanding: Yes      COUNSELING:  Reviewed preventive health counseling, as reflected in patient instructions        She reports that she has never smoked. She has never used smokeless tobacco.      Appropriate preventive services were discussed with this patient, including applicable screening as appropriate for fall prevention, nutrition, physical activity, Tobacco-use cessation, weight loss and cognition.  Checklist reviewing  preventive services available has been given to the patient.    Reviewed patients plan of care and provided an AVS. The Basic Care Plan (routine screening as documented in Health Maintenance) for Arlene meets the Care Plan requirement. This Care Plan has been established and reviewed with the Patient.          Kait Moss MD,   [facility]    Identified Health Risks:  I have reviewed Opioid Use Disorder and Substance Use Disorder risk factors and made any needed referrals.   Video start time 4:00  Video end time 4:40

## 2024-01-09 ENCOUNTER — TELEPHONE (OUTPATIENT)
Dept: OTHER | Facility: CLINIC | Age: 69
End: 2024-01-09
Payer: COMMERCIAL

## 2024-01-09 NOTE — TELEPHONE ENCOUNTER
Referral received via [employer] on 01/09/24.    Pt referred to [facility] by Dr. Kait Moss for Raybrent's    Pt needs to be scheduled for  NEW VASCULAR PATIENT consult with Vascular Medicine.  Will route to scheduling to coordinate an appointment at next available.    Appt note:    Ref by Dr. Moss for Raynaud's

## 2024-01-10 NOTE — TELEPHONE ENCOUNTER
Left voicemail with instructions for patient to call back to schedule their appointment(s)    January 10, 2024 , 11:33 AM

## 2024-01-10 NOTE — TELEPHONE ENCOUNTER
Future Appointments   Date Time Provider Department Center   1/19/2024  9:00 AM Alicia Locke MD [facility]

## 2024-01-19 ENCOUNTER — LAB (OUTPATIENT)
Dept: LAB | Facility: CLINIC | Age: 69
End: 2024-01-19
Payer: COMMERCIAL

## 2024-01-19 ENCOUNTER — OFFICE VISIT (OUTPATIENT)
Dept: OTHER | Facility: CLINIC | Age: 69
End: 2024-01-19
Attending: INTERNAL MEDICINE
Payer: COMMERCIAL

## 2024-01-19 VITALS
HEIGHT: 63 IN | SYSTOLIC BLOOD PRESSURE: 109 MMHG | HEART RATE: 83 BPM | BODY MASS INDEX: 20.62 KG/M2 | DIASTOLIC BLOOD PRESSURE: 67 MMHG | OXYGEN SATURATION: 100 % | WEIGHT: 116.4 LBS

## 2024-01-19 DIAGNOSIS — E78.5 HYPERLIPIDEMIA LDL GOAL <70: ICD-10-CM

## 2024-01-19 DIAGNOSIS — I73.00 RAYNAUD'S DISEASE WITHOUT GANGRENE: ICD-10-CM

## 2024-01-19 DIAGNOSIS — I73.00 RAYNAUD'S DISEASE WITHOUT GANGRENE: Primary | ICD-10-CM

## 2024-01-19 LAB — ERYTHROCYTE [SEDIMENTATION RATE] IN BLOOD BY WESTERGREN METHOD: 9 MM/HR (ref 0–30)

## 2024-01-19 PROCEDURE — 86431 RHEUMATOID FACTOR QUANT: CPT

## 2024-01-19 PROCEDURE — 86038 ANTINUCLEAR ANTIBODIES: CPT

## 2024-01-19 PROCEDURE — 36415 COLL VENOUS BLD VENIPUNCTURE: CPT

## 2024-01-19 PROCEDURE — 99213 OFFICE O/P EST LOW 20 MIN: CPT | Performed by: INTERNAL MEDICINE

## 2024-01-19 PROCEDURE — 85652 RBC SED RATE AUTOMATED: CPT

## 2024-01-19 PROCEDURE — 84443 ASSAY THYROID STIM HORMONE: CPT

## 2024-01-19 PROCEDURE — 86140 C-REACTIVE PROTEIN: CPT

## 2024-01-19 PROCEDURE — 99205 OFFICE O/P NEW HI 60 MIN: CPT | Performed by: INTERNAL MEDICINE

## 2024-01-19 RX ORDER — NITROGLYCERIN 20 MG/G
1 OINTMENT TOPICAL EVERY 24 HOURS
Qty: 30 G | Refills: 3 | Status: SHIPPED | OUTPATIENT
Start: 2024-01-19

## 2024-01-19 RX ORDER — AMLODIPINE BESYLATE 2.5 MG/1
2.5 TABLET ORAL EVERY MORNING
Qty: 30 TABLET | Refills: 5 | Status: SHIPPED | OUTPATIENT
Start: 2024-01-19

## 2024-01-19 NOTE — PATIENT INSTRUCTIONS
Raynaud Phenomenon     What is Raynaud phenomenon?   Raynaud phenomenon (RP) is a name given to episodes of color changes (usually pale or blue) in the hands and feet in response to the cold or emotional stress. The episodes usually come on suddenly and last minutes to hours. At first it might affect only one finger or toe, but may, over time, spread to other fingers and toes. There is sometimes a clear line at which the color changes from normal to discolored. It may affect both hands equally and may cause numbness, tingling, or an aching pain.  Sometimes the arms or legs may get mottled, and it can also affect other areas of the body, such as the ears, nose, and face. Upon warming, the hands and feet usually become red or flushed and they may feel swollen or itchy.     Who gets it?   RP is fairly common, especially in regions with colder climates. It affects girls somewhat more often than boys.  It often runs in families.      Most people with RP do not have an underlying rheumatic disease and will not go on to develop one. RP can be associated with rheumatic diseases including lupus, systemic sclerosis, and mixed connective tissue disease (MCTD), however these patients will have additional signs and symptoms of those diseases.     What causes it?   Normally, blood vessels constrict in response to cold temperatures in order to keep the body warm. It is thought that in RP, there is over-activation of blood vessel constriction in response to cold (and stress and exercise). This constriction leads to limited blood supply to the skin resulting in paleness. The blue color results because the tissue in the hands and feet are extracting so much oxygen from the slow-flowing blood. The fingers and toes appear red when re-warmed because of exaggerated blood flow through the blood vessels. These episodes of decreased and increased blood flow are mainly a nuisance, and do not (with rare exceptions) damage the fingers or  toes.    How is it diagnosed?   RP is diagnosed based upon the story and exam by your doctor. There are no simple tests to help diagnose RP. Your doctor would ask questions to be sure there is no associated rheumatic disease, do a general physical examination, and carefully look at your nailfold capillaries with a magnifier in the office.  Abnormalities in the nailfold capillaries may indicate an underlying rheumatic disease. Lab testing, such as an REENA, is not routinely necessary unless there are other reasons to suspect a rheumatic disease.     How is it treated?  Mild RP can be treated with simple measures, such as dressing warmly (for example wearing mittens, long underwear, and feet warmers), avoiding sudden cold exposures, minimizing emotional stress, and avoiding other aggravating factors (such as cigarette smoke, and stimulants such as decongestants and diet pills).     Children can learn through biofeedback training how to increase the blood flow to their fingers and toes and increase their skin temperatures. There are several medications that can be helpful, and the most commonly used medication is a calcium-channel blocker called nifedipine or amlodipine      Please go for labs order placed   Take amlodipine 2.5 mg daily in AM  Apply Nitroglycerine ointment at night time as directed   Thermal protection , thick insulated socks and gloves etc

## 2024-01-19 NOTE — PROGRESS NOTES
[facility] INITIAL VASCULAR MEDICINE CONSULT    (New patient visit)    PRIMARY HEALTH CARE PROVIDER:  Kait Moss MD      REFERRING HEALTH CARE PROVIDER;  Kait Moss MD      REASON FOR CONSULT: Evaluation and management of discoloration of turning all the toes into bluish, purplish, whitish in a 68-year-old female non-smoker nondiabetic      HPI: Arlene Beck is a 68 year old very pleasant female with no significant past medical history developed 10 years ago bluish discoloration of hands and she was diagnosed with Raynaud's and she moved in and out of the [place] and now she is back here for the last few days she noted bluish discoloration of all the toes turning into purplish and whitish.  She also developed some chilblains.  She is non-smoker nondiabetic she does not do any outdoor activities no skiing or skating.  This time hands are not involved no earlobe involvement or tip of the nose involvement.  No specific joint pains or stiffness etc.  She is new to this clinic reviewed available records in the epic and updated chart    She recently had a lab work done outside reviewed on her phone all of them are in good range except elevated cholesterol but she has excellent HDL      PAST MEDICAL HISTORY  Past Medical History:   Diagnosis Date    COPD (chronic obstructive pulmonary disease) (H)     I'm not sure if what I get rises to this condition    Fatigue 7/3/2015    Genital herpes, unspecified     Irritable bowel syndrome     Uncomplicated asthma     I have had ALLERGIC reaction asthma       CURRENT MEDICATIONS  Cholecalciferol (VITAMIN D3) 10 MCG/ML LIQD, Take by mouth daily  dhea 25 MG TABS, Take 25 mg by mouth daily 2 tabs daily  estradiol (VIVELLE-DOT) 0.025 MG/24HR bi-weekly patch,   ketoconazole (NIZORAL) 2 % external shampoo, Apply topically daily as needed for itching or irritation  magnesium gluconate (MAGONATE) 500 (27 Mg) MG tablet, Take 500 mg by mouth 2  times daily 2 caps daily  MULTIVITAMINS OR TABS, one daily, Dr Silva's  super food, takes 4 daily  niacin 500 MG tablet, Take by mouth daily (with breakfast) 3 times weekly  OMEGA-3 & OMEGA-6 FISH OIL OR CAPS,   progesterone (PROMETRIUM) 200 MG capsule,   TESTOSTERONE 2 MG/GM CREAM, Testosterone cream prescribed by Dr. Manzano. Uses 10mg click. Applies under alternating arm daily for 5 days then 2 days off.  valACYclovir (VALTREX) 500 MG tablet, Take 1 tablet (500 mg) by mouth 2 times daily  VITAMIN B COMPLEX OR TABS, takes two daily    No current facility-administered medications on file prior to visit.      PAST SURGICAL HISTORY:  Past Surgical History:   Procedure Laterality Date    LAPAROSCOPY PROCEDURE UNLISTED  1975    2 times, for pain     TUBAL LIGATION  2002    [facility] APPENDECTOMY  1967    [facility] EXCISION OF LINGUAL TONSIL  1963       ALLERGIES     Allergies   Allergen Reactions    Morphine And Related     Sulfa Antibiotics        FAMILY HISTORY  Family History   Problem Relation Age of Onset    Eye Disorder Mother         glaucoma    Allergies Mother     Breast Cancer Mother         double mastectomy    Coronary Artery Disease Mother         She had a stint put into her heart in March 2019. She is currently on hospice related to heart deterioration.    Thyroid Disease Mother         Low functioning thyroid    Osteoporosis Mother     Allergies Father     Heart Disease Father     Diabetes Father     Hypertension Father     Cerebrovascular Disease Father     Prostate Cancer Father     Hypertension Sister     Gastrointestinal Disease Sister         hyperplasia    Hypertension Sister         New onset of high blood pressure in the last year    Lipids Brother         tachycardia, knee and joint problems    Asthma Maternal Grandmother     Osteoporosis Maternal Grandmother     Thyroid Disease Maternal Grandmother        VASCULAR FAMILY HISTORY  1st order relative with atherosclerotic PAD: No  1st order relative with  AAA: No  Family history of Familial Hyperlipidemia No  Family History of Hypercoagulable state:No    VASCULAR RISK FACTORS  1. Diabetes:No   2. Smoking: has never smoked.  3. HTN: normotensive  4.Hyperlipidemia: Yes -       SOCIAL HISTORY  Social History     Socioeconomic History    Marital status: Single     Spouse name: Not on file    Number of children: 0    Years of education: Not on file    Highest education level: Not on file   Occupational History    Occupation:      Employer: [employer]     Employer: [employer]   Tobacco Use    Smoking status: Never    Smokeless tobacco: Never   Vaping Use    Vaping Use: Never used   Substance and Sexual Activity    Alcohol use: Not Currently     Comment: I have reduced sugar consumption since January 2017    Drug use: No    Sexual activity: Yes     Partners: Male     Birth control/protection: Post-menopausal, Female Surgical   Other Topics Concern     Service No    Blood Transfusions No    Caffeine Concern Yes    Occupational Exposure No    Hobby Hazards Yes     Comment: motorcycle--wear protective gear    Sleep Concern Yes     Comment: occ insomnia--stress related    Stress Concern Yes    Weight Concern No    Special Diet No    Back Care Yes     Comment: back exercises    Exercise No    Bike Helmet Yes    Seat Belt Yes    Self-Exams Yes    Parent/sibling w/ CABG, MI or angioplasty before 65F 55M? Yes     Comment: father and mother   Social History Narrative    Balanced Diet - Yes    Osteoporosis Prevention Measures - Dairy servings per day: 2 servings daily     Regular Exercise -  Yes Describe walks x a week, tennis, golf     Dental Exam - YES - Date: 6 months ago    Eye Exam - YES - Date: 12-08    Self Breast Exam - Yes    Abuse: Current or Past (Physical, Sexual or Emotional)- No    Do you feel safe in your environment - Yes    Guns stored in the home - No    Sunscreen used - Yes    Seatbelts used - Yes     Lipids -  YES - Date: 5-07    Glucose -  YES - Date: 5-07    Colon Cancer Screening - No    Hemoccults - NO    Pap Test -  YES - Date: 5-08    Do you have any concerns about STD's -  No    Mammography - YES - Date: has one today    DEXA - NO    Immunizations reviewed and up to date - Yes, tdap 1-07 5-28-09  DELORES Ramirez [facility]                 Social Determinants of Health     Financial Resource Strain: Low Risk  (1/8/2024)    Financial Resource Strain     Within the past 12 months, have you or your family members you live with been unable to get utilities (heat, electricity) when it was really needed?: No   Food Insecurity: Low Risk  (1/8/2024)    Food Insecurity     Within the past 12 months, did you worry that your food would run out before you got money to buy more?: No     Within the past 12 months, did the food you bought just not last and you didn t have money to get more?: No   Transportation Needs: Low Risk  (1/8/2024)    Transportation Needs     Within the past 12 months, has lack of transportation kept you from medical appointments, getting your medicines, non-medical meetings or appointments, work, or from getting things that you need?: No   Physical Activity: Not on file   Stress: Not on file   Social Connections: Not on file   Interpersonal Safety: Not on file   Housing Stability: Low Risk  (1/8/2024)    Housing Stability     Do you have housing? : Yes     Are you worried about losing your housing?: No       ROS:   General: No change in weight, sleep or appetite.  Normal energy.  No fever or chills  Eyes: Negative for vision changes or eye problems  ENT: No problems with ears, nose or throat.  No difficulty swallowing.  Resp: No coughing, wheezing or shortness of breath  CV: No chest pains or palpitations  GI: No nausea, vomiting,  heartburn, abdominal pain, diarrhea, constipation or change in bowel habits  : No urinary frequency or dysuria, bladder or kidney problems  Musculoskeletal: No significant  "muscle or joint pains  Neurologic: No headaches, numbness, tingling, weakness, problems with balance or coordination  Psychiatric: No problems with anxiety, depression or mental health  Heme/immune/allergy: No history of bleeding or clotting problems or anemia.  No allergies or immune system problems  Endocrine: No history of thyroid disease, diabetes or other endocrine disorders  Skin: No rashes,worrisome lesions or skin problems  Vascular: Bluish purplish and whitish toes worse during the wintertime and exposure to the cold    EXAM:  /67 (BP Location: Left arm, Patient Position: Chair, Cuff Size: Adult Regular)   Pulse 83   Ht 5' 3\" (1.6 m)   Wt 116 lb 6.4 oz (52.8 kg)   LMP 04/20/2015   SpO2 100%   BMI 20.62 kg/m    In general, the patient is a pleasant female in no apparent distress.    HEENT: NC/AT.  PERRLA.  EOMI.  Sclerae white, not injected.  Nares clear.  Pharynx without erythema or exudate.  Dentition intact.    Neck: No adenopathy.  No thyromegaly. Carotids +2/2 bilaterally without bruits.  No jugular venous distension.   Heart: RRR. Normal S1, S2 splits physiologically. No murmur, rub, click, or gallop. The PMI is in the 5th ICS in the midclavicular line. There is no heave.    Lungs: CTA.  No ronchi, wheezes, rales.  No dullness to percussion.   Abdomen: Soft, nontender, nondistended. No organomegaly. No AAA.  No bruits.   Extremities: Vascular:  Bluish, purplish and whitish toes with abnormal capillary response 5 to 6 seconds  No foot ulcers or leg ulcers  Excellent palpable DP and PT pulses  Dopplerable biphasic/triphasic pedal pulses      Labs:  LIPID RESULTS:  Lab Results   Component Value Date    CHOL 286 (H) 10/04/2021    CHOL 240 (H) 08/15/2019    HDL 87 10/04/2021    HDL 87 08/15/2019     (H) 10/04/2021     (H) 08/15/2019    TRIG 91 06/20/2022    TRIG 99 08/15/2019    CHOLHDLRATIO 3.4 07/03/2015       LIVER ENZYME RESULTS:  Lab Results   Component Value Date    AST 13 " 08/24/2015    ALT 19 08/24/2015       CBC RESULTS:  Lab Results   Component Value Date    WBC 5.6 08/01/2014    RBC 4.62 08/01/2014    HGB 14.7 07/03/2015    HCT 44.0 08/01/2014    MCV 95 08/01/2014    MCH 31.6 08/01/2014    MCHC 33.2 08/01/2014    RDW 14.1 08/01/2014     08/01/2014       BMP RESULTS:  Lab Results   Component Value Date     10/04/2021     08/15/2019    POTASSIUM 3.8 10/04/2021    POTASSIUM 4.0 08/15/2019    CHLORIDE 103 10/04/2021    CHLORIDE 110 (H) 08/15/2019    CO2 28 10/04/2021    CO2 29 08/15/2019    ANIONGAP 3 10/04/2021    ANIONGAP 5 08/15/2019    GLC 82 10/04/2021    GLC 80 08/15/2019    BUN 8 10/04/2021    BUN 14 08/15/2019    CR 0.74 10/04/2021    CR 0.94 08/15/2019    GFRESTIMATED 86 10/04/2021    GFRESTIMATED 65 08/15/2019    GFRESTBLACK 75 08/15/2019    CLINTON 9.3 10/04/2021    CLINTON 9.0 08/15/2019        THYROID RESULTS:  Lab Results   Component Value Date    TSH 1.29 07/03/2015       Procedures:       Assessment and Plan:     1. Raynaud's disease without gangrene  2. Hyperlipidemia LDL goal <70     - Vascular Surgery Referral  - CRP inflammation; Future  - Erythrocyte sedimentation rate auto; Future  - Anti Nuclear Shante IgG by IFA with Reflex; Future  - Rheumatoid factor; Future  - nitroGLYcerin (NITRO-BID) 2 % OINT ointment; Place 1 inch (15 mg) onto the skin every 24 hours Apply to affected area ( toes or fingers)  at night and avoid touching eyes or face .  Dispense: 30 g; Refill: 3  - amLODIPine (NORVASC) 2.5 MG tablet; Take 1 tablet (2.5 mg) by mouth every morning  Dispense: 30 tablet; Refill: 5  - TSH with free T4 reflex; Future    This is a very pleasant 68-year-old female non-smoker with classical features of Raynaud's and possibly chilblains of toes but no evidence of arterial insufficiency she has excellent palpable and dopplerable DP and PT pulses.  She is normotensive blood pressure is soft side  Currently not taking any medications for Raynaud's  We discussed  about the Raynaud's disease and education sheet given to the patient    At present my recommendations,    Thermal protection suggested utilize thick insulated gloves and also take inflated socks or double layer socks  Initiate low-dose amlodipine 2.5 mg daily in the morning new prescription sent  Apply Nitro-Bid ointment in the evening as directed  Will check inflammatory markers, REENA, rheumatoid factor and thyroid function tests  In 1 to 2 months virtual visit or sooner if does not respond or symptoms gets worse        60 minutes spent on the date of the encounter doing chart review, history and exam, documentation, and further activities as noted above.  She is new to this clinic reviewed available records in the epic and updated chart  Thank you for the consultation  Copy of this note to primary care physician  This note was dictated by utilizing dragon software  She had a lot of questions and all of them were answered    Alicia Locke MD,FATIEN,FSVM,FNLA, FACP  Vascular Medicine  Clinical Hypertension Specialist   Clinical Lipidologist

## 2024-01-19 NOTE — PROGRESS NOTES
"Patient is here to discuss consult    /64 (BP Location: Right arm, Patient Position: Chair, Cuff Size: Adult Regular)   Pulse 84   Ht 5' 3\" (1.6 m)   Wt 116 lb 6.4 oz (52.8 kg)   LMP 04/20/2015   SpO2 100%   BMI 20.62 kg/m      Questions patient would like addressed today are: N/A.    Refills are needed: No    Has homecare services and agency name:  Ayla KELLER"

## 2024-01-20 LAB
CRP SERPL-MCNC: <3 MG/L
RHEUMATOID FACT SERPL-ACNC: <10 IU/ML
TSH SERPL DL<=0.005 MIU/L-ACNC: 0.8 UIU/ML (ref 0.3–4.2)

## 2024-01-22 LAB — ANA SER QL IF: NEGATIVE

## 2024-01-23 ENCOUNTER — TELEPHONE (OUTPATIENT)
Dept: OTHER | Facility: CLINIC | Age: 69
End: 2024-01-23
Payer: COMMERCIAL

## 2024-01-25 NOTE — TELEPHONE ENCOUNTER
Future Appointments   Date Time Provider Department Center   3/20/2024  4:00 PM Alicia Locke MD [facility]

## 2024-04-03 ENCOUNTER — NURSE TRIAGE (OUTPATIENT)
Dept: FAMILY MEDICINE | Facility: CLINIC | Age: 69
End: 2024-04-03

## 2024-04-03 ENCOUNTER — OFFICE VISIT (OUTPATIENT)
Dept: URGENT CARE | Facility: URGENT CARE | Age: 69
End: 2024-04-03
Payer: COMMERCIAL

## 2024-04-03 VITALS
HEART RATE: 68 BPM | OXYGEN SATURATION: 100 % | RESPIRATION RATE: 16 BRPM | TEMPERATURE: 98 F | SYSTOLIC BLOOD PRESSURE: 121 MMHG | DIASTOLIC BLOOD PRESSURE: 82 MMHG

## 2024-04-03 DIAGNOSIS — H00.025 HORDEOLUM INTERNUM LEFT LOWER EYELID: Primary | ICD-10-CM

## 2024-04-03 PROCEDURE — 99213 OFFICE O/P EST LOW 20 MIN: CPT | Performed by: INTERNAL MEDICINE

## 2024-04-03 RX ORDER — ERYTHROMYCIN 5 MG/G
0.5 OINTMENT OPHTHALMIC
Qty: 3.5 G | Refills: 0 | Status: SHIPPED | OUTPATIENT
Start: 2024-04-03

## 2024-04-03 ASSESSMENT — ENCOUNTER SYMPTOMS: EYE DISCHARGE: 0

## 2024-04-03 NOTE — TELEPHONE ENCOUNTER
"Woke up this morning and has something that looks like a pimple on my lower lid  Reason for Disposition   Patient wants to be seen    Additional Information   Negative: Unresponsive, passed out or very weak   Negative: Difficulty breathing or wheezing   Negative: Difficulty swallowing or slurred speech and sudden onset   Negative: Sounds like a life-threatening emergency to the triager   Negative: Chemical got in the eye   Negative: Recent injury to the eye   Negative: Entire face is swollen   Negative: Sacs of clear fluid (blisters) on whites of eyes (allergic cysts)   Negative: Contact with pollen, other allergic substance or eyedrops   Negative: Bee sting and within last 24 hours   Negative: Insect bite suspected   Negative: Yellow or green discharge (pus) in the eye   Negative: Redness of white area (sclera) of eye(s)   Negative: SEVERE eyelid swelling (i.e., shut or almost) and fever   Negative: Eyelid (outer) is very red and fever   Negative: Pregnant 20 or more weeks and sudden weight gain (e.g., more than 3 lbs or 1.4 kg in one week)   Negative: Postpartum (from 0 to 6 weeks after delivery) and sudden weight gain (e.g., more than 3 lbs or 1.4 kg in one week)   Negative: Patient sounds very sick or weak to the triager   Negative: SEVERE eyelid swelling (i.e., shut or almost) and involves both eyes   Negative: SEVERE eyelid swelling and taking an ACE Inhibitor medication (e.g., benazepril/LOTENSIN, captopril/CAPOTEN, enalapril/VASOTEC, lisinopril/ZESTRIL)   Negative: SEVERE eyelid swelling on one side that is red and painful (or tender to touch)   Negative: SEVERE eyelid swelling on one side and sinus pain or pressure   Negative: Fever   Negative: Painful rash and multiple small blisters grouped together (i.e., dermatomal distribution or \"band\" or \"stripe\")   Negative: MODERATE to SEVERE eyelid swelling on one side  (Exception: Due to a mosquito bite.)   Negative: Eyelid is red and painful (or tender to " "touch)   Negative: Swelling of both lower legs (i.e., bilateral pedal edema)   Negative: MILD eyelid swelling (puffiness) and sinus pain or pressure    Answer Assessment - Initial Assessment Questions  1. ONSET: \"When did the swelling start?\" (e.g., minutes, hours, days)      Woke up this morning   2. LOCATION: \"What part of the eyelids is swollen?\"      Left eye, bump is on the inside of the eye, can see a little bump on the outside  Second bump on the top of the lower lid  3. SEVERITY: \"How swollen is it?\"      A little bump  4. ITCHING: \"Is there any itching?\" If Yes, ask: \"How much?\"   (Scale 1-10; mild, moderate or severe)      yes  5. PAIN: \"Is the swelling painful to touch?\" If Yes, ask: \"How painful is it?\"   (Scale 1-10; mild, moderate or severe)      Feels irritated , \"bothering me\" , maybe about 2  6. FEVER: \"Do you have a fever?\" If Yes, ask: \"What is it, how was it measured, and when did it start?\"       no  7. CAUSE: \"What do you think is causing the swelling?\"      stye  8. RECURRENT SYMPTOM: \"Have you had eyelid swelling before?\" If Yes, ask: \"When was the last time?\" \"What happened that time?\"      maybe a stye years ago  9. OTHER SYMPTOMS: \"Do you have any other symptoms?\" (e.g., blurred vision, eye discharge, rash, runny nose)    Protocols used: Eye - Swelling-A-OH    "

## 2024-04-03 NOTE — TELEPHONE ENCOUNTER
This may just be a stye but difficult to assess by phone. No clinic appts available so UC advised for today. Pt agrees    Lillian Yu RN, BSN  [facility]

## 2024-04-03 NOTE — PROGRESS NOTES
ASSESSMENT AND PLAN:      ICD-10-CM    1. Hordeolum internum left lower eyelid  H00.025 erythromycin (ROMYCIN) 5 MG/GM ophthalmic ointment      PLAN:  Eye Problem: Warm packs for comfort. Antibiotic ointment per order.      Return if symptoms worsen or fail to improve.      Radha Dickson MD  [facility]    Subjective     Arlene Beck is a 68 year old who presents for Patient presents with:  Eye Problem: Today, 2 bumps on left lower lid, painful    an established patient of [facility].    Eye Problem    Onset of symptoms was TODAY  Location: left eye     Current and Associated symptoms: eyelid redness, swelling and lump noted  Treatment measures tried include warm packs  Context: History of styes - years ago but on outside      Review of Systems   Eyes:  Negative for discharge.           Objective    /82   Pulse 68   Temp 98  F (36.7  C)   Resp 16   LMP 04/20/2015   SpO2 100%   Physical Exam  Vitals reviewed.   Constitutional:       Appearance: Normal appearance.   Eyes:      Comments: left eye lid - inner conjunctiva with white head   Neurological:      Mental Status: She is alert.

## 2024-06-26 NOTE — TELEPHONE ENCOUNTER
"POST PARTUM Connie Kirkland MD  " "Patient has been taking 2-3 grams of valacyclovir daily for past 2 1/2 weeks due to continuous outbreaks of blistering rash.    Advised her to be seen in urgent care today due to length of symptoms, continuous outbreak and large dose of valacyclovir she is taking.    Reason for Disposition   Shingles rash and onset > 72 hours ago    Additional Information   Negative: Shingles rash of face and eye pain or blurred vision   Negative: Shingles rash on the eyelid or tip of the nose   Negative: Shingles rash and spots start appearing other places on body   Negative: Patient sounds very sick or weak to the triager   Negative: Shingles rash (matches SYMPTOMS) and weak immune system (e.g., HIV positive, cancer chemotherapy, chronic steroid treatment, splenectomy) and NOT taking antiviral medication   Negative: Shingles rash of face and facial weakness   Negative: Shingles rash of face or ear and earache or ringing in the ear   Negative: Fever > 100.4 F  (38.0 C)   Negative: SEVERE pain (e.g., excruciating)   Negative: Shingles rash (matches SYMPTOMS) and onset < 72 hours ago (3 days)   Negative: Looks infected (spreading redness, pus) and no fever   Negative: Patient wants to be seen    Answer Assessment - Initial Assessment Questions  1. APPEARANCE of RASH: \"Describe the rash.\"       Blistering but with geranium oil, none erupt  2. LOCATION: \"Where is the rash located?\"       Right side of body: groin, stomach  3. ONSET: \"When did the rash start?\"       3 weeks ago  4. ITCHING: \"Does the rash itch?\" If Yes, ask: \"How bad is the itch?\"  (Scale 1-10; or mild, moderate, severe)      itchy  5. PAIN: \"Does the rash hurt?\" If Yes, ask: \"How bad is the pain?\"  (Scale 0-10; or none, mild, moderate, severe)     - NONE (0): no pain     - MILD (1-3): doesn't interfere with normal activities      - MODERATE (4-7): interferes with normal activities or awakens from sleep      - SEVERE (8-10): excruciating pain, unable to do any normal " "activities      Has had headaches and generalback pain  6. OTHER SYMPTOMS: \"Do you have any other symptoms?\" (e.g., fever)      Has felt feverish  7. PREGNANCY: \"Is there any chance you are pregnant?\" \"When was your last menstrual period?\"      no    Protocols used: Shingles (Zoster)-A-OH    "

## 2024-09-06 ENCOUNTER — PATIENT OUTREACH (OUTPATIENT)
Dept: CARE COORDINATION | Facility: CLINIC | Age: 69
End: 2024-09-06
Payer: COMMERCIAL

## 2024-09-06 ENCOUNTER — TRANSFERRED RECORDS (OUTPATIENT)
Dept: FAMILY MEDICINE | Facility: CLINIC | Age: 69
End: 2024-09-06

## 2024-09-26 ENCOUNTER — TRANSFERRED RECORDS (OUTPATIENT)
Dept: HEALTH INFORMATION MANAGEMENT | Facility: CLINIC | Age: 69
End: 2024-09-26
Payer: COMMERCIAL

## 2024-10-04 ENCOUNTER — OFFICE VISIT (OUTPATIENT)
Dept: URGENT CARE | Facility: URGENT CARE | Age: 69
End: 2024-10-04
Payer: COMMERCIAL

## 2024-10-04 VITALS
WEIGHT: 114 LBS | BODY MASS INDEX: 20.19 KG/M2 | DIASTOLIC BLOOD PRESSURE: 72 MMHG | TEMPERATURE: 97.8 F | HEART RATE: 67 BPM | OXYGEN SATURATION: 100 % | SYSTOLIC BLOOD PRESSURE: 134 MMHG

## 2024-10-04 DIAGNOSIS — B37.31 YEAST INFECTION OF THE VAGINA: Primary | ICD-10-CM

## 2024-10-04 LAB
ALBUMIN UR-MCNC: NEGATIVE MG/DL
APPEARANCE UR: CLEAR
BILIRUB UR QL STRIP: NEGATIVE
CLUE CELLS: ABNORMAL
COLOR UR AUTO: YELLOW
GLUCOSE UR STRIP-MCNC: NEGATIVE MG/DL
HGB UR QL STRIP: NEGATIVE
KETONES UR STRIP-MCNC: NEGATIVE MG/DL
LEUKOCYTE ESTERASE UR QL STRIP: NEGATIVE
NITRATE UR QL: NEGATIVE
PH UR STRIP: 7 [PH] (ref 5–7)
SP GR UR STRIP: 1.01 (ref 1–1.03)
TRICHOMONAS, WET PREP: ABNORMAL
UROBILINOGEN UR STRIP-ACNC: 0.2 E.U./DL
WBC'S/HIGH POWER FIELD, WET PREP: ABNORMAL
YEAST, WET PREP: PRESENT

## 2024-10-04 PROCEDURE — 81003 URINALYSIS AUTO W/O SCOPE: CPT

## 2024-10-04 PROCEDURE — 99213 OFFICE O/P EST LOW 20 MIN: CPT | Performed by: PHYSICIAN ASSISTANT

## 2024-10-04 PROCEDURE — 87210 SMEAR WET MOUNT SALINE/INK: CPT | Performed by: PHYSICIAN ASSISTANT

## 2024-10-04 RX ORDER — FLUCONAZOLE 200 MG/1
TABLET ORAL
Qty: 3 TABLET | Refills: 0 | Status: SHIPPED | OUTPATIENT
Start: 2024-10-04

## 2024-10-04 NOTE — PROGRESS NOTES
Assessment & Plan          1. Yeast infection of the vagina    - UA Macroscopic with reflex to Microscopic and Culture - Clinic Collect  - Wet prep - Clinic Collect  - fluconazole (DIFLUCAN) 200 MG tablet; Take one now then repeat in 4 days then take 3rd pill 4 days after that pill.  Dispense: 3 tablet; Refill: 0     Follow up with PCP.                   LUCIA Scott [facility] [place]    Eugene Jacobs is a 69 year old female who presents to clinic today for the following health issues:  Chief Complaint   Patient presents with    Urgent Care     Yeast infection recurring        HPI    Here for concern for yeast infection. Trying to address since May with apple cider douches at home. Tries to eat less sugar. Partner is having some itching as well. Itching is most prominent for her. White discharge. Keeps coming back.           Review of Systems        Objective    /72   Pulse 67   Temp 97.8  F (36.6  C) (Temporal)   Wt 51.7 kg (114 lb)   LMP 04/20/2015   SpO2 100%   BMI 20.19 kg/m    Physical Exam  HENT:      Head: Normocephalic.   Musculoskeletal:      Cervical back: Normal range of motion.   Neurological:      Mental Status: She is alert.   Psychiatric:         Mood and Affect: Mood normal.         Behavior: Behavior normal.

## 2024-10-10 ENCOUNTER — ANCILLARY PROCEDURE (OUTPATIENT)
Dept: MAMMOGRAPHY | Facility: CLINIC | Age: 69
End: 2024-10-10
Attending: FAMILY MEDICINE
Payer: COMMERCIAL

## 2024-10-10 DIAGNOSIS — Z12.31 VISIT FOR SCREENING MAMMOGRAM: ICD-10-CM

## 2024-10-10 PROCEDURE — 77063 BREAST TOMOSYNTHESIS BI: CPT | Mod: TC | Performed by: RADIOLOGY

## 2024-10-10 PROCEDURE — 77067 SCR MAMMO BI INCL CAD: CPT | Mod: TC | Performed by: RADIOLOGY

## 2024-10-21 ENCOUNTER — ORDERS ONLY (AUTO-RELEASED) (OUTPATIENT)
Dept: FAMILY MEDICINE | Facility: CLINIC | Age: 69
End: 2024-10-21
Payer: COMMERCIAL

## 2024-10-21 ENCOUNTER — TELEPHONE (OUTPATIENT)
Dept: FAMILY MEDICINE | Facility: CLINIC | Age: 69
End: 2024-10-21
Payer: COMMERCIAL

## 2024-10-21 DIAGNOSIS — Z12.11 SCREENING FOR COLON CANCER: Primary | ICD-10-CM

## 2024-10-21 DIAGNOSIS — Z12.11 SCREENING FOR COLON CANCER: ICD-10-CM

## 2024-10-21 NOTE — TELEPHONE ENCOUNTER
Patient called stating she received a notification that she is due for a cologuard on 10/10/2024 and but her annual wellness appt is not until 1/22/25. She is wondering if Dr. Moss would like her to get the cologuard done before her appt or at her appt? Order needed if Dr. Moss recommend getting cologuard done before her annual wellness.        Campos Diaz, BSN RN  [facility]

## 2024-10-21 NOTE — TELEPHONE ENCOUNTER
I placed the order. Please let Arlene know. For the future, it is very helpful if you pend and link the order to the screening diagnosis before sending to clinician to sign. Thanks, Kait Moss M.D.

## 2024-11-14 ENCOUNTER — TRANSFERRED RECORDS (OUTPATIENT)
Dept: HEALTH INFORMATION MANAGEMENT | Facility: CLINIC | Age: 69
End: 2024-11-14
Payer: COMMERCIAL

## 2024-11-15 LAB — NONINV COLON CA DNA+OCC BLD SCRN STL QL: NEGATIVE

## 2024-12-31 DIAGNOSIS — A60.00 GENITAL HERPES SIMPLEX, UNSPECIFIED SITE: ICD-10-CM

## 2024-12-31 NOTE — TELEPHONE ENCOUNTER
Per patient due to new insurance for 2025 she can not see you any longer. Out of the  physicians the only one in network is Dr. Beasley. Requesting bridge to newly scheduled visit in March-      Message sent to credentialing team via [email or web address]

## 2025-01-02 RX ORDER — VALACYCLOVIR HYDROCHLORIDE 500 MG/1
500 TABLET, FILM COATED ORAL 2 TIMES DAILY
Qty: 180 TABLET | Refills: 3 | Status: SHIPPED | OUTPATIENT
Start: 2025-01-02

## 2025-01-21 ENCOUNTER — OFFICE VISIT (OUTPATIENT)
Dept: URGENT CARE | Facility: URGENT CARE | Age: 70
End: 2025-01-21
Payer: COMMERCIAL

## 2025-01-21 VITALS
OXYGEN SATURATION: 99 % | RESPIRATION RATE: 18 BRPM | WEIGHT: 115 LBS | SYSTOLIC BLOOD PRESSURE: 121 MMHG | HEIGHT: 63 IN | TEMPERATURE: 98.6 F | DIASTOLIC BLOOD PRESSURE: 72 MMHG | BODY MASS INDEX: 20.38 KG/M2 | HEART RATE: 86 BPM

## 2025-01-21 DIAGNOSIS — R07.0 THROAT PAIN: ICD-10-CM

## 2025-01-21 DIAGNOSIS — B37.31 CANDIDAL VAGINITIS: Primary | ICD-10-CM

## 2025-01-21 DIAGNOSIS — K04.7 DENTAL ABSCESS: ICD-10-CM

## 2025-01-21 DIAGNOSIS — K29.00 ACUTE GASTRITIS WITHOUT HEMORRHAGE, UNSPECIFIED GASTRITIS TYPE: ICD-10-CM

## 2025-01-21 LAB — DEPRECATED S PYO AG THROAT QL EIA: NEGATIVE

## 2025-01-21 PROCEDURE — 87651 STREP A DNA AMP PROBE: CPT | Performed by: PHYSICIAN ASSISTANT

## 2025-01-21 PROCEDURE — 99214 OFFICE O/P EST MOD 30 MIN: CPT | Performed by: PHYSICIAN ASSISTANT

## 2025-01-21 RX ORDER — OMEPRAZOLE 40 MG/1
40 CAPSULE, DELAYED RELEASE ORAL DAILY
Qty: 30 CAPSULE | Refills: 0 | Status: SHIPPED | OUTPATIENT
Start: 2025-01-21

## 2025-01-21 RX ORDER — FLUCONAZOLE 150 MG/1
150 TABLET ORAL
Qty: 3 TABLET | Refills: 0 | Status: SHIPPED | OUTPATIENT
Start: 2025-01-21 | End: 2025-01-28

## 2025-01-21 RX ORDER — CHLORHEXIDINE GLUCONATE ORAL RINSE 1.2 MG/ML
15 SOLUTION DENTAL 2 TIMES DAILY
Status: SHIPPED
Start: 2025-01-21

## 2025-01-21 ASSESSMENT — PAIN SCALES - GENERAL: PAINLEVEL_OUTOF10: MODERATE PAIN (5)

## 2025-01-22 LAB — S PYO DNA THROAT QL NAA+PROBE: NOT DETECTED

## 2025-01-22 NOTE — PROGRESS NOTES
Assessment & Plan     Candidal vaginitis    Patient has been having recurrent infections and tough to get rid of them  Trial course of medications  Referral to gyn  - fluconazole (DIFLUCAN) 150 MG tablet  Dispense: 3 tablet; Refill: 0  - Ob/Gyn  Referral    Dental abscess    Patient has a lower gum graft with localized mouth abscess  Sore throat is present  Will start on antibiotics  Peridex mouth wash  - chlorhexidine (PERIDEX) 0.12 % solution  - amoxicillin-clavulanate (AUGMENTIN) 875-125 MG tablet  Dispense: 20 tablet; Refill: 0    Acute gastritis without hemorrhage, unspecified gastritis type    Gastritis is an upset stomach. It happens when something irritates the stomach lining. Many things can cause gastritis, such as an infection or illness, food or drink, or medicines. Your belly may bloat and ache. You may belch, vomit, and feel sick to your stomach.  Minor stomach upset can be treated at home. Medicines that reduce or block stomach acid may he    - omeprazole (PRILOSEC) 40 MG DR capsule  Dispense: 30 capsule; Refill: 0    Throat pain    Strep Is neg  Culture pending  - Streptococcus A Rapid Screen w/Reflex to PCR       At today's visit with Arlene Beck , we discussed results, diagnosis, medications and formulated a plan.  We also discussed red flags for immediate return to clinic/ER, as well as indications for follow up with PCP if not improved in 3 days. Patient understood and agreed to plan. Arlene Beck was discharged with stable vitals and has no further questions.       No follow-ups on file.    Phu Rowell, [facility], PA-C  [facility] [place]    Eugene Jacobs is a 69 year old female who presents to clinic today for the following health issues:  Chief Complaint   Patient presents with    Urgent Care    Dental Pain     Pt in clinic to have eval for oral swelling, pain and pus.  Pt is also c/o hives, fever and heartburn.    Pt has also been nauseous.   "Pt states she had dental Surgery 1/2/2025       HPI  Review of Systems  Constitutional, HEENT, cardiovascular, pulmonary, gi and gu systems are negative, except as otherwise noted.      Objective    /72   Pulse 86   Temp 98.6  F (37  C) (Temporal)   Resp 18   Ht 1.6 m (5' 3\")   Wt 52.2 kg (115 lb)   LMP 04/20/2015   SpO2 99%   BMI 20.37 kg/m    Physical Exam   GENERAL: alert and no distress  EYES: Eyes grossly normal to inspection, PERRL and conjunctivae and sclerae normal  HENT: normal cephalic/atraumatic, ear canals and TM's normal, nose and mouth without ulcers or lesions, and pos for left lower jaw and gingival swelling, tenderness  NECK: no adenopathy, no asymmetry, masses, or scars  RESP: lungs clear to auscultation - no rales, rhonchi or wheezes  CV: regular rate and rhythm, normal S1 S2, no S3 or S4, no murmur, click or rub, no peripheral edema  ABDOMEN: pos for epigastric discomfort  MS: no gross musculoskeletal defects noted, no edema  SKIN: no suspicious lesions or rashes  NEURO: Normal strength and tone, mentation intact and speech normal  PSYCH: mentation appears normal, affect normal/bright    Results for orders placed or performed in visit on 01/21/25   Streptococcus A Rapid Screen w/Reflex to PCR     Status: Normal    Specimen: Throat; Swab   Result Value Ref Range    Group A Strep antigen Negative Negative           "

## 2025-03-07 PROBLEM — E78.5 HYPERLIPIDEMIA, UNSPECIFIED HYPERLIPIDEMIA TYPE: Status: ACTIVE | Noted: 2025-03-07

## 2025-03-07 PROBLEM — L21.9 SEBORRHEIC DERMATITIS: Status: ACTIVE | Noted: 2025-03-07

## 2025-03-07 PROBLEM — N39.3 FEMALE STRESS INCONTINENCE: Status: ACTIVE | Noted: 2025-03-07

## 2025-03-07 PROBLEM — E67.3 HYPERVITAMINOSIS D: Status: ACTIVE | Noted: 2025-03-07

## 2025-03-07 PROBLEM — Z80.3 FAMILY HISTORY OF MALIGNANT NEOPLASM OF BREAST: Status: ACTIVE | Noted: 2025-03-07

## 2025-03-07 PROBLEM — L57.0 AK (ACTINIC KERATOSIS): Status: ACTIVE | Noted: 2025-03-07

## 2025-03-07 PROBLEM — D22.9 MULTIPLE PIGMENTED NEVI: Status: ACTIVE | Noted: 2025-03-07

## 2025-03-07 PROBLEM — Z82.49 FAMILY HISTORY OF THROMBOSIS: Status: ACTIVE | Noted: 2025-03-07

## 2025-03-07 PROBLEM — Z92.29 HISTORY OF POSTMENOPAUSAL HORMONE REPLACEMENT THERAPY: Status: ACTIVE | Noted: 2025-03-07

## 2025-03-08 ENCOUNTER — TELEPHONE (OUTPATIENT)
Dept: FAMILY MEDICINE | Facility: CLINIC | Age: 70
End: 2025-03-08
Payer: COMMERCIAL

## 2025-03-08 DIAGNOSIS — R79.89 ELEVATED LFTS: Primary | ICD-10-CM

## 2025-03-08 DIAGNOSIS — E67.3 HYPERVITAMINOSIS D: ICD-10-CM

## 2025-03-08 DIAGNOSIS — E78.5 HYPERLIPIDEMIA, UNSPECIFIED HYPERLIPIDEMIA TYPE: ICD-10-CM

## 2025-03-08 DIAGNOSIS — E83.52 SERUM CALCIUM ELEVATED: ICD-10-CM

## 2025-03-09 NOTE — TELEPHONE ENCOUNTER
Normal sodium, potassium, kidney function  Normal magnesium  Normal thyroid function  Normal ferritin ( iron storage) & iron  LDL is elevated at 172  The 10-year ASCVD risk score (Janette DK, et al., 2019) is: 6.7% ( cardiovascular risk)   Consider a CT calcium score to assess coronary artery calcification to guide treatment  Liver tests ALk phos and ALT are elevated recommend rechecking in few weeks in a lab only apt if still elevated may need an ultrasound of liver and additional labs  Maybe related to hormonal or suppliments or gallstones, I'm not sure at this point  Calcium is also elevated  Likely due to vit d being elevated  Decrease vit d ( not sure what [place]wood you are currently taking  But we should recheck this with additional tests like your parathyroid in a few weeks as well in a lab only apt   I can make further recommendations after that

## 2025-03-10 ENCOUNTER — TELEPHONE (OUTPATIENT)
Dept: FAMILY MEDICINE | Facility: CLINIC | Age: 70
End: 2025-03-10
Payer: COMMERCIAL

## 2025-03-10 NOTE — TELEPHONE ENCOUNTER
"  General Call    Contacts       Contact Date/Time Type Contact Phone/Fax    03/10/2025 11:03 AM CDT Email (Incoming) Credentialing [facility] team           Reason for Call:  Message received 3/7/25 regarding patient being told she had to see LATRICE Beasley clinician at  only due to insurance    What are your questions or concerns:  Writer reached out to credentialing team end of 2024 after connecting with leadership team ie direction.  Communicated ended in January as writer was awaiting update from credentialing team  Message from LATRICE Beasley to medical director team for  received last week and routed to writer for review/follow up  Email status check sent and response below received;  \"[facility] should have updated the providers enrollment so the [place] location is no longer suppressed.  I received the approval from [facility] 1/24/25.    I have attached the approval.\"      Patient informed and appreciative of follow up. Of note, she enjoyed LATRICE Beasley and visit that was completed. States it is nice to know she can see either, but she may continue w/ LATRICE Beasley.    Further requested scheduling line for DEXA so was transferred end of call    "

## 2025-03-11 ENCOUNTER — MYC MEDICAL ADVICE (OUTPATIENT)
Dept: FAMILY MEDICINE | Facility: CLINIC | Age: 70
End: 2025-03-11
Payer: COMMERCIAL

## 2025-03-12 ENCOUNTER — ANCILLARY PROCEDURE (OUTPATIENT)
Dept: BONE DENSITY | Facility: CLINIC | Age: 70
End: 2025-03-12
Attending: FAMILY MEDICINE
Payer: COMMERCIAL

## 2025-03-12 DIAGNOSIS — Z13.820 SCREENING FOR OSTEOPOROSIS: ICD-10-CM

## 2025-03-12 DIAGNOSIS — Z00.00 ROUTINE HISTORY AND PHYSICAL EXAMINATION OF ADULT: ICD-10-CM

## 2025-03-12 NOTE — TELEPHONE ENCOUNTER
Writer responded via [employer].  ESTHER GarrettN, RN-BC  [facility]

## 2025-03-12 NOTE — TELEPHONE ENCOUNTER
So if taking 2 drops of 1200 units vit d a day getting about 2400 units daily   Vit d level recently elevated at 76  Decrease to 1 drop a day and recheck vit d in a few months  Calcium was elevated suspect from getting too much vitamin d but need to rule out a parathyroid issue hence recommendation to check calcium ( bmp & ionized calcium in addition to pth in few weeks  For the elevated ALT recheck liver tests with above to assess trend and then can decide after that if an ultrasound of liver is necessary to evaluate more   The orders for lft, bmp, ionized calcium and pth are in epic  Can just schedule lab only apt  Vit d not ordered as would be too early to note a difference even if make changes today so to check that in a couple months.

## 2025-03-12 NOTE — TELEPHONE ENCOUNTER
Dr. Ramos Sierra regarding vitamin D and ALT. Appreciate advisement/review.    ADRIÁN Acuña, BSN, PHN, AMB-BC (she/her)  [facility] RN

## 2025-03-13 ENCOUNTER — DOCUMENTATION ONLY (OUTPATIENT)
Dept: OTHER | Facility: CLINIC | Age: 70
End: 2025-03-13
Payer: COMMERCIAL

## 2025-03-13 NOTE — RESULT ENCOUNTER NOTE
Hello -    Here are my comments about your recent results:    DEXA scan shows osteoporosis decreased bone mineral density at a level where you are at increased risk for fracture.  Scores are lowest at the hips specially the left femoral neck.    Its difficult to explain everything and nuances involved in a written format but can read below and get back to me what would like to do next     Recommend   Calcium  1200 mg daily through diet given recent elevated calcium awaiting repeat est to make more recommendations on calcium & vitamin d   Dietary sources of calcium   Milk 8 ounces has 300 mg of elemental calcium  Frazer is a good source of calcium  Yogurt 8 oz = 300 mg of elemental calcium    Vit d enough to keep blood vit d level at >40 ( yours was recently 76 so follow what discussed regard that earlier )       For all postmenopausal women with osteoporosis, we advise lifestyle measures to reduce bone loss and complications:    Weight bearing and muscle strengthening exercise ( a walking for spine & hips, gentle weight lifting for wrists  Avoiding smoking  Fall prevention  Avoidance of heavy alcohol use    Prior to starting pharmacotherapy for osteoporosis, review the following serum tests usually performed as part of the evaluation of osteoporosis:  Calcium ( 10.6) so need to recheck to see if a parathyroid issue as already planned)   Creatinine (0.73)   25-hydroxyvitamin D ( 76)    Before initiating pharmacotherapy ensure:  Estimated glomerular filtration rate (eGFR) >=30 ( 90 )  Calcium is within normal range  25-hydroxyvitamin D >=20 ng/mL  Adequate ongoing calcium and vitamin D intake    Most women - For the initial treatment of most postmenopausal women with osteoporosis, we suggest oral bisphosphonates     We prefer these agents based on efficacy, cost, and long-term safety data.   Oral bisphosphonates are contraindicated in those with esophageal disorders (eg, esophageal stricture) or known malabsorption  (eg, Rickey-en-Y gastric bypass)   We typically prefer alendronate, although risedronate is a reasonable alternative.   Treatment is adjusted based on response    Alendronate ( fosamax) :  Usually do 70 mg once a week. The optimal duration of therapy has not been established. Consider discontinuing after 5 years if bone mineral density (BMD) is stable, there have been no previous fragility fractures, and short-term fracture risk is low. If fracture risk remains high (eg, fragility fracture before or during therapy), consider extending therapy for up to 10 years or switching to alternative therapy. If discontinued, the decision to resume therapy is based on multiple factors, including decline in BMD and risk factors for fracture    If a once-weekly dose is missed, administer the next morning after remembered; then return to the original scheduled day of the week on the once-weekly schedule; however, do not administer 2 doses on the same day     Potential adverse reactions of bisphosphonates:  Atypical femur fractures (AFF) have been reported with bisphosphonate use, including alendronate. The fractures include subtrochanteric femur (bone just below the hip joint) and diaphyseal femur (long segment of the thigh bone). The benefits of therapy (when used for osteoporosis) generally outweigh the absolute risk of AFF within the first 5 years of treatment, especially in patients with high fracture risk The risk decreases after bisphosphonate discontinuation AFF is estimated to occur in ~0.2 % of bisphosphonate users after >=5 years of therapy   Risk factors:    Long-term treatment (>3 to 5 years)     race (in [place])     Femoral bowing     Glucocorticoid use (>1 year)    Esophagitis, dysphagia, esophageal ulcer, erosive esophagitis, esophageal stenosis (rare), and esophageal perforation (rare) have been reported. Oropharyngeal ulcer has also been noted Experiencing a GI event increases the likelihood of decreased  compliance at 1 year or discontinuation   Risk factors:    Incorrect administration technique (ie, <180 mL water, lying down after administration)     Older adults    Concurrent nonsteroidal anti-inflammatory drug or antithrombotic use    Prior GI issues    While transient decreased serum calcium is expected with the use of alendronate (and all bisphosphonates) secondary to their mechanism of action, cases of symptomatic hypocalcemia have been reported. This has been seen in the setting of a known or unknown diagnosis of hypoparathyroidism and is reversible with discontinuation of alendronate, regardless of cause   Risk factors:    Baseline hypocalcemia    Impaired kidney function    Impaired parathyroid function    IV bisphosphonate    Vitamin D deficiency     Osteonecrosis of the jaw (ONJ) was first described in dental literature with the use of IV bisphosphonates. However, there is conflicting evidence of whether this risk is seen with oral bisphosphonates, such as alendronate, or is simply an increased risk in those who are treated with agents for osteoporosis. ONJ is most commonly reversible and not life-threatening; however, the possibility of ONJ increases the risk of nonadherence    Risk factors:    Alcohol use disorder    Anemia    Cancer and anticancer therapy    Corticosteroid therapy    Dental extraction and/or dental implant procedures( MIGHT BE GOOD TO GET A DENTAL CHECK BEFORE STARTING MED)     Diabetes    Extended duration (>3 years) of bisphosphonate    High-dose, IV bisphosphonate    Immunological disorders    Oral surgery or trauma    Poor oral hygiene    Poorly fitting dental appliance    Radiotherapy to head and neck    Tobacco smoking     Bone/joint/muscle pain: Severe (and occasionally debilitating) bone, joint, and/or muscle pain have been reported during bisphosphonate treatment. The onset of pain ranged from a single day to several months. Consider discontinuing therapy in patients who  experience severe symptoms; symptoms usually resolve upon discontinuation. Some patients experienced recurrence when rechallenged with the same drug or another bisphosphonate;       Patients who cannot receive or do not tolerate oral bisphosphonates can be treated with an intravenous (IV) bisphosphonate instead.     Zoledronic acid is our agent of choice, as it is the only IV bisphosphonate with demonstrated efficacy for fracture prevention.    For patients who cannot receive any bisphosphonate (oral or IV), options include an anabolic agent (eg, teriparatide ( forteo) , abaloparatide, romosozumab), denosumab ( prolia) , or raloxifene. The choice of agent depends on fracture risk (eg, history of fragility fractures, bone mineral density T-scores, comorbidities), efficacy, adverse effect profile, and patient preferences.  Because of an increased risk of vertebral fracture after discontinuation of denosumab, the need for indefinite administration of denosumab should be discussed with patients prior to its initiation.    Although there is no consensus on the optimal frequency of monitoring on pharmacotherapy, we check a dual-energy x-ray absorptiometry (DXA) of the hip and spine after one to two years of therapy     If bone mineral density (BMD) is stable or improved, we continue therapy and monitor less frequently (eg, two to five years depending on the clinical setting).  If there is a BMD decrease beyond the least significant change (LSC) or a new fracture despite treatment, we evaluate and address contributing factors and adjust therapy as needed.   If the LSC is not available, a decline of >=5 percent can be used as the threshold for adjustment of therapy.    All patients with progressive BMD loss despite osteoporosis pharmacotherapy are referred for specialist management.( Endocrine    If you would like to talk to endocrine first to discuss options before deciding on a treatment option let me know I can put the  referral in   It may take several months to get in with them.    If your repeat calcium and parathyroid tests show any concern then we may want you to see endocrine anyway    Please let us know if you have any questions or concerns.     Regards,  Phuong Beasley MD

## 2025-03-29 ENCOUNTER — LAB (OUTPATIENT)
Dept: LAB | Facility: CLINIC | Age: 70
End: 2025-03-29
Payer: COMMERCIAL

## 2025-03-29 DIAGNOSIS — R79.89 ELEVATED LFTS: ICD-10-CM

## 2025-03-29 DIAGNOSIS — E67.3 HYPERVITAMINOSIS D: ICD-10-CM

## 2025-03-29 DIAGNOSIS — E83.52 SERUM CALCIUM ELEVATED: ICD-10-CM

## 2025-03-29 DIAGNOSIS — E78.5 HYPERLIPIDEMIA, UNSPECIFIED HYPERLIPIDEMIA TYPE: ICD-10-CM

## 2025-03-29 LAB
ALBUMIN SERPL BCG-MCNC: 3.9 G/DL (ref 3.5–5.2)
ALP SERPL-CCNC: 60 U/L (ref 40–150)
ALT SERPL W P-5'-P-CCNC: 27 U/L (ref 0–50)
ANION GAP SERPL CALCULATED.3IONS-SCNC: 10 MMOL/L (ref 7–15)
AST SERPL W P-5'-P-CCNC: 28 U/L (ref 0–45)
BILIRUB DIRECT SERPL-MCNC: 0.16 MG/DL (ref 0–0.3)
BILIRUB SERPL-MCNC: 0.5 MG/DL
BUN SERPL-MCNC: 6.7 MG/DL (ref 8–23)
CA-I BLD-MCNC: 4.9 MG/DL (ref 4.4–5.2)
CALCIUM SERPL-MCNC: 9.4 MG/DL (ref 8.8–10.4)
CHLORIDE SERPL-SCNC: 104 MMOL/L (ref 98–107)
CREAT SERPL-MCNC: 0.76 MG/DL (ref 0.51–0.95)
EGFRCR SERPLBLD CKD-EPI 2021: 85 ML/MIN/1.73M2
GLUCOSE SERPL-MCNC: 89 MG/DL (ref 70–99)
HCO3 SERPL-SCNC: 24 MMOL/L (ref 22–29)
POTASSIUM SERPL-SCNC: 3.6 MMOL/L (ref 3.4–5.3)
PROT SERPL-MCNC: 6.1 G/DL (ref 6.4–8.3)
PTH-INTACT SERPL-MCNC: 34 PG/ML (ref 15–65)
SODIUM SERPL-SCNC: 138 MMOL/L (ref 135–145)

## 2025-03-29 PROCEDURE — 80053 COMPREHEN METABOLIC PANEL: CPT

## 2025-03-29 PROCEDURE — 82330 ASSAY OF CALCIUM: CPT

## 2025-03-29 PROCEDURE — 82248 BILIRUBIN DIRECT: CPT

## 2025-03-29 PROCEDURE — 36415 COLL VENOUS BLD VENIPUNCTURE: CPT

## 2025-03-29 PROCEDURE — 83970 ASSAY OF PARATHORMONE: CPT

## 2025-04-04 PROBLEM — N81.89 PELVIC FLOOR WEAKNESS: Status: ACTIVE | Noted: 2025-04-04

## 2025-06-25 ENCOUNTER — OFFICE VISIT (OUTPATIENT)
Dept: URGENT CARE | Facility: URGENT CARE | Age: 70
End: 2025-06-25
Payer: COMMERCIAL

## 2025-06-25 VITALS
WEIGHT: 119 LBS | RESPIRATION RATE: 16 BRPM | DIASTOLIC BLOOD PRESSURE: 70 MMHG | OXYGEN SATURATION: 99 % | HEIGHT: 63 IN | TEMPERATURE: 97.6 F | SYSTOLIC BLOOD PRESSURE: 100 MMHG | BODY MASS INDEX: 21.09 KG/M2 | HEART RATE: 71 BPM

## 2025-06-25 DIAGNOSIS — H00.011 HORDEOLUM EXTERNUM OF RIGHT UPPER EYELID: Primary | ICD-10-CM

## 2025-06-25 PROCEDURE — 3074F SYST BP LT 130 MM HG: CPT | Performed by: NURSE PRACTITIONER

## 2025-06-25 PROCEDURE — 3078F DIAST BP <80 MM HG: CPT | Performed by: NURSE PRACTITIONER

## 2025-06-25 PROCEDURE — 99213 OFFICE O/P EST LOW 20 MIN: CPT | Performed by: NURSE PRACTITIONER

## 2025-06-25 RX ORDER — ERYTHROMYCIN 5 MG/G
0.5 OINTMENT OPHTHALMIC AT BEDTIME
Qty: 3.5 G | Refills: 0 | Status: SHIPPED | OUTPATIENT
Start: 2025-06-25 | End: 2025-07-02

## 2025-06-25 NOTE — PROGRESS NOTES
Urgent Care Clinic Visit    Chief Complaint   Patient presents with    Urgent Care     Bump on right upper eyelid x 8 days, doing hot pack but worsening pain today.                6/25/2025     4:27 PM   Additional Questions   Roomed by ANNETTE Dixon

## 2025-06-25 NOTE — PROGRESS NOTES
"Assessment & Plan     1. Hordeolum externum of right upper eyelid (Primary)   continue to use a warm or cool compress along with erythromycin as directed we discussed red flag symptoms that would warrant emergent or urgent evaluation patient verbalized understanding is agreement this plan handout given from epic and reviewed.  - erythromycin (ROMYCIN) 5 MG/GM ophthalmic ointment; Place 0.5 inches into the right eye at bedtime for 7 days.  Dispense: 3.5 g; Refill: 0      JETHRO Vides [facility] [place]    Eugene Jacobs is a 69 year old female who presents to clinic today for the following health issues:  Chief Complaint   Patient presents with    Urgent Care     Bump on right upper eyelid x 8 days, doing hot pack but worsening pain today.          6/25/2025     4:27 PM   Additional Questions   Roomed by ANNETTE Dixon     HPI      Eye Problem    Onset of symptoms was 8 day(s) ago.   Location: right eye   Course of illness is worsening.    Severity moderate  Current and Associated symptoms: burning, redness, eyelid swelling  Treatment measures tried include warm packs  Context: none      Review of Systems  Constitutional, HEENT, cardiovascular, pulmonary, gi and gu systems are negative, except as otherwise noted.      Objective    /70   Pulse 71   Temp 97.6  F (36.4  C) (Oral)   Resp 16   Ht 1.6 m (5' 3\")   Wt 54 kg (119 lb)   LMP 04/20/2015   SpO2 99%   BMI 21.08 kg/m    Physical Exam   GENERAL: alert and no distress  EYES: PERRL, EOMI, and eyelids- hordeolum/sty OD  NECK: no adenopathy, no asymmetry, masses, or scars  RESP: lungs clear to auscultation - no rales, rhonchi or wheezes  CV: regular rate and rhythm, normal S1 S2, no S3 or S4, no murmur, click or rub, no peripheral edema  MS: no gross musculoskeletal defects noted, no edema  SKIN: no suspicious lesions or rashes          "